# Patient Record
Sex: MALE | Race: WHITE
[De-identification: names, ages, dates, MRNs, and addresses within clinical notes are randomized per-mention and may not be internally consistent; named-entity substitution may affect disease eponyms.]

---

## 2017-03-25 ENCOUNTER — TRANSCRIPTION ENCOUNTER (OUTPATIENT)
Age: 37
End: 2017-03-25

## 2017-07-03 ENCOUNTER — OUTPATIENT (OUTPATIENT)
Dept: OUTPATIENT SERVICES | Facility: HOSPITAL | Age: 37
LOS: 1 days | Discharge: HOME | End: 2017-07-03

## 2017-07-03 DIAGNOSIS — F11.20 OPIOID DEPENDENCE, UNCOMPLICATED: ICD-10-CM

## 2017-07-25 ENCOUNTER — TRANSCRIPTION ENCOUNTER (OUTPATIENT)
Age: 37
End: 2017-07-25

## 2017-11-18 ENCOUNTER — TRANSCRIPTION ENCOUNTER (OUTPATIENT)
Age: 37
End: 2017-11-18

## 2018-10-16 ENCOUNTER — TRANSCRIPTION ENCOUNTER (OUTPATIENT)
Age: 38
End: 2018-10-16

## 2018-10-30 ENCOUNTER — TRANSCRIPTION ENCOUNTER (OUTPATIENT)
Age: 38
End: 2018-10-30

## 2019-02-02 ENCOUNTER — TRANSCRIPTION ENCOUNTER (OUTPATIENT)
Age: 39
End: 2019-02-02

## 2019-08-12 ENCOUNTER — TRANSCRIPTION ENCOUNTER (OUTPATIENT)
Age: 39
End: 2019-08-12

## 2020-03-18 ENCOUNTER — TRANSCRIPTION ENCOUNTER (OUTPATIENT)
Age: 40
End: 2020-03-18

## 2020-06-11 ENCOUNTER — EMERGENCY (EMERGENCY)
Facility: HOSPITAL | Age: 40
LOS: 0 days | Discharge: HOME | End: 2020-06-11
Attending: EMERGENCY MEDICINE | Admitting: EMERGENCY MEDICINE
Payer: COMMERCIAL

## 2020-06-11 VITALS
HEART RATE: 85 BPM | OXYGEN SATURATION: 100 % | SYSTOLIC BLOOD PRESSURE: 125 MMHG | RESPIRATION RATE: 18 BRPM | DIASTOLIC BLOOD PRESSURE: 76 MMHG

## 2020-06-11 VITALS
TEMPERATURE: 98 F | OXYGEN SATURATION: 97 % | SYSTOLIC BLOOD PRESSURE: 145 MMHG | RESPIRATION RATE: 20 BRPM | HEART RATE: 117 BPM | DIASTOLIC BLOOD PRESSURE: 95 MMHG

## 2020-06-11 DIAGNOSIS — F41.9 ANXIETY DISORDER, UNSPECIFIED: ICD-10-CM
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
## 2020-06-11 DIAGNOSIS — S00.83XA CONTUSION OF OTHER PART OF HEAD, INITIAL ENCOUNTER: ICD-10-CM

## 2020-06-11 DIAGNOSIS — M54.2 CERVICALGIA: ICD-10-CM
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
## 2020-06-11 LAB
ALBUMIN SERPL ELPH-MCNC: 4.7 G/DL — SIGNIFICANT CHANGE UP (ref 3.5–5.2)
ALP SERPL-CCNC: 47 U/L — SIGNIFICANT CHANGE UP (ref 30–115)
ALT FLD-CCNC: 46 U/L — HIGH (ref 0–41)
ANION GAP SERPL CALC-SCNC: 14 MMOL/L — SIGNIFICANT CHANGE UP (ref 7–14)
APTT BLD: 26.1 SEC — LOW (ref 27–39.2)
AST SERPL-CCNC: 36 U/L — SIGNIFICANT CHANGE UP (ref 0–41)
BASOPHILS # BLD AUTO: 0.08 K/UL — SIGNIFICANT CHANGE UP (ref 0–0.2)
BASOPHILS NFR BLD AUTO: 0.8 % — SIGNIFICANT CHANGE UP (ref 0–1)
BILIRUB SERPL-MCNC: 0.3 MG/DL — SIGNIFICANT CHANGE UP (ref 0.2–1.2)
BLD GP AB SCN SERPL QL: SIGNIFICANT CHANGE UP
BUN SERPL-MCNC: 18 MG/DL — SIGNIFICANT CHANGE UP (ref 10–20)
CALCIUM SERPL-MCNC: 9.2 MG/DL — SIGNIFICANT CHANGE UP (ref 8.5–10.1)
CHLORIDE SERPL-SCNC: 107 MMOL/L — SIGNIFICANT CHANGE UP (ref 98–110)
CO2 SERPL-SCNC: 22 MMOL/L — SIGNIFICANT CHANGE UP (ref 17–32)
CREAT SERPL-MCNC: 1.4 MG/DL — SIGNIFICANT CHANGE UP (ref 0.7–1.5)
EOSINOPHIL # BLD AUTO: 0.18 K/UL — SIGNIFICANT CHANGE UP (ref 0–0.7)
EOSINOPHIL NFR BLD AUTO: 1.7 % — SIGNIFICANT CHANGE UP (ref 0–8)
ETHANOL SERPL-MCNC: <10 MG/DL — SIGNIFICANT CHANGE UP
GLUCOSE SERPL-MCNC: 142 MG/DL — HIGH (ref 70–99)
HCT VFR BLD CALC: 44.8 % — SIGNIFICANT CHANGE UP (ref 42–52)
HGB BLD-MCNC: 15.1 G/DL — SIGNIFICANT CHANGE UP (ref 14–18)
IMM GRANULOCYTES NFR BLD AUTO: 0.6 % — HIGH (ref 0.1–0.3)
INR BLD: 1.14 RATIO — SIGNIFICANT CHANGE UP (ref 0.65–1.3)
LACTATE SERPL-SCNC: 1 MMOL/L — SIGNIFICANT CHANGE UP (ref 0.7–2)
LIDOCAIN IGE QN: 30 U/L — SIGNIFICANT CHANGE UP (ref 7–60)
LYMPHOCYTES # BLD AUTO: 19.9 % — LOW (ref 20.5–51.1)
LYMPHOCYTES # BLD AUTO: 2.11 K/UL — SIGNIFICANT CHANGE UP (ref 1.2–3.4)
MCHC RBC-ENTMCNC: 31.5 PG — HIGH (ref 27–31)
MCHC RBC-ENTMCNC: 33.7 G/DL — SIGNIFICANT CHANGE UP (ref 32–37)
MCV RBC AUTO: 93.3 FL — SIGNIFICANT CHANGE UP (ref 80–94)
MONOCYTES # BLD AUTO: 0.93 K/UL — HIGH (ref 0.1–0.6)
MONOCYTES NFR BLD AUTO: 8.8 % — SIGNIFICANT CHANGE UP (ref 1.7–9.3)
NEUTROPHILS # BLD AUTO: 7.23 K/UL — HIGH (ref 1.4–6.5)
NEUTROPHILS NFR BLD AUTO: 68.2 % — SIGNIFICANT CHANGE UP (ref 42.2–75.2)
NRBC # BLD: 0 /100 WBCS — SIGNIFICANT CHANGE UP (ref 0–0)
PLATELET # BLD AUTO: 413 K/UL — HIGH (ref 130–400)
POTASSIUM SERPL-MCNC: 4.6 MMOL/L — SIGNIFICANT CHANGE UP (ref 3.5–5)
POTASSIUM SERPL-SCNC: 4.6 MMOL/L — SIGNIFICANT CHANGE UP (ref 3.5–5)
PROT SERPL-MCNC: 7.2 G/DL — SIGNIFICANT CHANGE UP (ref 6–8)
PROTHROM AB SERPL-ACNC: 13.1 SEC — HIGH (ref 9.95–12.87)
RBC # BLD: 4.8 M/UL — SIGNIFICANT CHANGE UP (ref 4.7–6.1)
RBC # FLD: 15.5 % — HIGH (ref 11.5–14.5)
SODIUM SERPL-SCNC: 143 MMOL/L — SIGNIFICANT CHANGE UP (ref 135–146)
TROPONIN T SERPL-MCNC: <0.01 NG/ML — SIGNIFICANT CHANGE UP
WBC # BLD: 10.59 K/UL — SIGNIFICANT CHANGE UP (ref 4.8–10.8)
WBC # FLD AUTO: 10.59 K/UL — SIGNIFICANT CHANGE UP (ref 4.8–10.8)

## 2020-06-11 PROCEDURE — 70450 CT HEAD/BRAIN W/O DYE: CPT | Mod: 26

## 2020-06-11 PROCEDURE — 99282 EMERGENCY DEPT VISIT SF MDM: CPT

## 2020-06-11 PROCEDURE — 72170 X-RAY EXAM OF PELVIS: CPT | Mod: 26

## 2020-06-11 PROCEDURE — 73030 X-RAY EXAM OF SHOULDER: CPT | Mod: 26,50

## 2020-06-11 PROCEDURE — 99291 CRITICAL CARE FIRST HOUR: CPT

## 2020-06-11 PROCEDURE — 72125 CT NECK SPINE W/O DYE: CPT | Mod: 26

## 2020-06-11 PROCEDURE — 74177 CT ABD & PELVIS W/CONTRAST: CPT | Mod: 26

## 2020-06-11 PROCEDURE — 71260 CT THORAX DX C+: CPT | Mod: 26

## 2020-06-11 PROCEDURE — 71045 X-RAY EXAM CHEST 1 VIEW: CPT | Mod: 26

## 2020-06-11 RX ORDER — ACETAMINOPHEN 500 MG
650 TABLET ORAL ONCE
Refills: 0 | Status: COMPLETED | OUTPATIENT
Start: 2020-06-11 | End: 2020-06-11

## 2020-06-11 RX ADMIN — Medication 650 MILLIGRAM(S): at 09:20
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
## 2020-06-11 NOTE — ED PROVIDER NOTE - NSFOLLOWUPCLINICS_GEN_ALL_ED_FT
[facility] ([facility])  Rehabilitation  [facility] [address], [address] [place] [address]  Phone: [phone]  Fax:   Follow Up Time:     [facility]  Trauma Surgery  [address], [facility] [place] [address]  Phone: [phone]  Fax:   Follow Up Time:

## 2020-06-11 NOTE — ED PROVIDER NOTE - PATIENT PORTAL LINK FT
You can access the FollowMyHealth Patient Portal offered by [facility] by registering at the following website: [email or web address]. By joining [facility]’s FollowMyHealth portal, you will also be able to view your health information using other applications (apps) compatible with our system.
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
## 2020-06-11 NOTE — ED ADULT NURSE NOTE - NSIMPLEMENTINTERV_GEN_ALL_ED
Implemented All Universal Safety Interventions:  [place] to call system. Call bell, personal items and telephone within reach. Instruct patient to call for assistance. Room bathroom lighting operational. Non-slip footwear when patient is off stretcher. Physically safe environment: no spills, clutter or unnecessary equipment. Stretcher in lowest position, wheels locked, appropriate side rails in place.
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
## 2020-06-11 NOTE — ED PROVIDER NOTE - NS ED ROS FT
pt is very anxious,   NAD,   AAOx3,   head (+) bruise and swelling to right forehead,   CN II-XII intact,   PEERL, EOMi,   TM (-) hemotympanium,   neck (+) TTP over C7-T1,   lungs CTA B/L,   CV S1S2 regular,   abdomen soft/NT/ND/(+)BS,   ext (-) edema,   motor 5/5x4, sensation intact,   reports tingling in b/l UE.

## 2020-06-11 NOTE — ED PROVIDER NOTE - PHYSICAL EXAMINATION
Constitutional: Well developed, well nourished. NAD  TRAUMA: ABC intact. GCS 15. FAST negative.  Head: Normocephalic. +swelling w/ hematoma L frontal  Eyes: PERRL. EOMI. No Raccoon eyes.   ENT: No nasal discharge. No septal hematoma. No Mark sign. Mucous membranes moist.  Neck: Supple. Painless ROM. No midline tenderness, stepoffs.  Cardiovascular: Normal S1, S2. Regular rate and rhythm. No murmurs, rubs, or gallops.  Pulmonary: Normal respiratory rate and effort. Lungs clear to auscultation bilaterally. No wheezing, rales, or rhonchi.  CHEST: No chest wall tenderness, crepitus.  Abdominal: Soft. Nondistended. Nontender. No rebound, guarding, rigidity.  BACK: No midline T/L/S tenderness, stepoffs. No saddle paresthesia.  Extremities. Pelvis stable. No traumatic deformities, tenderness of extremities.  Skin: No rashes, cyanosis, lacerations, abrasions.  Neuro: AAOx3. Strength 5/5 in all extremities. Sensation intact throughout. No focal neurological deficits.  Psych: Anxious. Normal affect.
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
## 2020-06-11 NOTE — ED PROVIDER NOTE - CRITICAL CARE PROVIDED
additional history taking/consultation with other physicians/interpretation of diagnostic studies/documentation

## 2020-06-13 ENCOUNTER — EMERGENCY (EMERGENCY)
Facility: HOSPITAL | Age: 40
LOS: 0 days | Discharge: HOME | End: 2020-06-13
Attending: STUDENT IN AN ORGANIZED HEALTH CARE EDUCATION/TRAINING PROGRAM | Admitting: STUDENT IN AN ORGANIZED HEALTH CARE EDUCATION/TRAINING PROGRAM
Payer: COMMERCIAL

## 2020-06-13 VITALS
OXYGEN SATURATION: 100 % | TEMPERATURE: 98 F | RESPIRATION RATE: 19 BRPM | SYSTOLIC BLOOD PRESSURE: 145 MMHG | HEART RATE: 78 BPM | DIASTOLIC BLOOD PRESSURE: 86 MMHG

## 2020-06-13 VITALS
RESPIRATION RATE: 17 BRPM | DIASTOLIC BLOOD PRESSURE: 81 MMHG | SYSTOLIC BLOOD PRESSURE: 137 MMHG | HEART RATE: 74 BPM | OXYGEN SATURATION: 100 %
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
## 2020-06-13 DIAGNOSIS — R22.0 LOCALIZED SWELLING, MASS AND LUMP, HEAD: ICD-10-CM

## 2020-06-13 DIAGNOSIS — S00.83XA CONTUSION OF OTHER PART OF HEAD, INITIAL ENCOUNTER: ICD-10-CM

## 2020-06-13 DIAGNOSIS — S00.03XA CONTUSION OF SCALP, INITIAL ENCOUNTER: ICD-10-CM
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
## 2020-06-13 PROCEDURE — 99284 EMERGENCY DEPT VISIT MOD MDM: CPT

## 2020-06-13 PROCEDURE — 70486 CT MAXILLOFACIAL W/O DYE: CPT | Mod: 26

## 2020-06-13 RX ORDER — OXYCODONE AND ACETAMINOPHEN 5; 325 MG/1; MG/1
1 TABLET ORAL ONCE
Refills: 0 | Status: DISCONTINUED | OUTPATIENT
Start: 2020-06-13 | End: 2020-06-13

## 2020-06-13 RX ADMIN — OXYCODONE AND ACETAMINOPHEN 1 TABLET(S): 5; 325 TABLET ORAL at 11:30

## 2020-06-13 NOTE — ED PROVIDER NOTE - NSFOLLOWUPINSTRUCTIONS_ED_ALL_ED_FT
Hematoma    A hematoma is a collection of blood under the skin, in an organ, in a body space, in a joint space, or in other tissue. The blood can clot to form a lump that you can see and feel. The lump is often firm and may sometimes become sore and tender. Most hematomas get better in a few days to weeks. However, some hematomas may be serious and require medical care. Hematomas can range in size from very small to very large.     CAUSES  A hematoma can be caused by a blunt or penetrating injury. It can also be caused by spontaneous leakage from a blood vessel under the skin. Spontaneous leakage from a blood vessel is more likely to occur in older people, especially those taking blood thinners. Sometimes, a hematoma can develop after certain medical procedures.    SIGNS AND SYMPTOMS  A firm lump on the body.   Possible pain and tenderness in the area.  Bruising. Blue, dark blue, purple-red, or yellowish skin may appear at the site of the hematoma if the hematoma is close to the surface of the skin.     For hematomas in deeper tissues or body spaces, the signs and symptoms may be subtle. For example, an intra-abdominal hematoma may cause abdominal pain, weakness, fainting, and shortness of breath. An intracranial hematoma may cause a headache or symptoms such as weakness, trouble speaking, or a change in consciousness.    DIAGNOSIS  A hematoma can usually be diagnosed based on your medical history and a physical exam. Imaging tests may be needed if your health care provider suspects a hematoma in deeper tissues or body spaces, such as the abdomen, head, or chest. These tests may include ultrasonography or a CT scan.     TREATMENT  Hematomas usually go away on their own over time. Rarely does the blood need to be drained out of the body. Large hematomas or those that may affect vital organs will sometimes need surgical drainage or monitoring.    HOME CARE INSTRUCTIONS  Apply ice to the injured area:    Put ice in a plastic bag.    Place a towel between your skin and the bag.    Leave the ice on for 20 minutes, 2–3 times a day for the first 1 to 2 days.    After the first 2 days, switch to using warm compresses on the hematoma.    Elevate the injured area to help decrease pain and swelling. Wrapping the area with an elastic bandage may also be helpful. Compression helps to reduce swelling and promotes shrinking of the hematoma. Make sure the bandage is not wrapped too tight.    If your hematoma is on a lower extremity and is painful, crutches may be helpful for a couple days.    Only take over-the-counter or prescription medicines as directed by your health care provider.     SEEK IMMEDIATE MEDICAL CARE IF:  You have increasing pain, or your pain is not controlled with medicine.    You have a fever.    You have worsening swelling or discoloration.    Your skin over the hematoma breaks or starts bleeding.    Your hematoma is in your chest or abdomen and you have weakness, shortness of breath, or a change in consciousness.  Your hematoma is on your scalp (caused by a fall or injury) and you have a worsening headache or a change in alertness or consciousness.    MAKE SURE YOU:  Understand these instructions.   Will watch your condition.  Will get help right away if you are not doing well or get worse.    ADDITIONAL NOTES AND INSTRUCTIONS    Please follow up with your Primary MD in 24-48 hr.  Seek immediate medical care for any new/worsening signs or symptoms.

## 2020-06-13 NOTE — ED PROVIDER NOTE - PHYSICAL EXAMINATION
GEN: Alert & Oriented x 3, No acute distress. Calm, appropriate.  Head and Neck: Swelling noted to right forehead scalp and eye. Tenderness with palpation to right forehead.  ENT:Oral mucosa pink, moist without lesions. No trismus. Tenderness with palpation to right maxilla. no step offs.   Eyes: PERRL. EOMI. subconjunctival hemorrhage to right eye. No hyphema. No conjunctival injection. Swelling around right eye.   RESP: Lungs clear to auscult bilat. no wheezes, rhonchi or rales. No retractions. Equal air entry.  CARDIO: regular rate and rhythm, no murmurs, rubs or gallops. Normal S1, S2.  Radial pulses 2+ bilaterally.   ABD: Soft, Nondistended. No rebound tenderness/guarding.  No pulsatile mass. No tenderness with palpation x 4 quadrants.  MS: no midline tenderness throughout. tenderness to paraspinous musculature mid-thoracic. no obvious swelling or deformities.   SKIN: ecchymosis noted to bilateral eyes.   Neuro: A&O x3, CN II- XII intact, strength 5/5 throughout extremities, sensation intact. Speech & mentation intact. No drooping of eye or mouth. Without dysarthria or aphasia. Finger to nose intact. Romberg Neg. Coordinated gait.
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
## 2020-06-13 NOTE — ED PROVIDER NOTE - NS ED ROS FT
GEN: (-) fever, (-) chills, (-) malaise  HEENT: (-) vision changes, (-) HA  CV: (-) chest pain  PULM: (-) cough, (-) wheezing, (-) dyspnea  GI: (-) abdominal pain,(-) Nausea, (-) Vomiting  NEURO: (-) weakness, (-) paresthesias, (-) syncope, (-) dizziness  : (-) dysuria, (-) frequency, (-) urgency, (-) incontinence   MS: (+) back pain, (-) joint pain, (-)myalgias, (+) swelling  SKIN: (-) rashes, (-) new lesions  HEME: (-) bleeding, (+) ecchymosis

## 2020-06-13 NOTE — ED PROVIDER NOTE - CARE PROVIDER_API CALL
Your Primary Care Provider,   Phone: (   )    -  Fax: (   )    -  Follow Up Time: 1-3 Days    Portia Nye  REHAB IP PROF-OFFICE STAFF  [address] [place] [address]  Phone: [phone]  Fax: [phone]  Follow Up Time:

## 2020-06-13 NOTE — ED PROVIDER NOTE - PROVIDER TOKENS
FREE:[LAST:[Your Primary Care Provider],PHONE:[(   )    -],FAX:[(   )    -],FOLLOWUP:[1-3 Days]],PROVIDER:[TOKEN:[[id number]]]
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
## 2020-06-13 NOTE — ED PROVIDER NOTE - NSFOLLOWUPCLINICS_GEN_ALL_ED_FT
[facility]  Dental  [address] [place] [address]  Phone: [phone]  Fax:   Follow Up Time: 1-3 Days    [facility]  Trauma Surgery  [address] [place] [address]  Phone: [phone]  Fax:   Follow Up Time:

## 2020-06-13 NOTE — ED PROVIDER NOTE - PATIENT PORTAL LINK FT
You can access the FollowMyHealth Patient Portal offered by [facility] by registering at the following website: [email or web address]. By joining [employer]’s FollowMyHealth portal, you will also be able to view your health information using other applications (apps) compatible with our system.

## 2020-06-18 ENCOUNTER — EMERGENCY (EMERGENCY)
Facility: HOSPITAL | Age: 40
LOS: 0 days | Discharge: HOME | End: 2020-06-18
Attending: EMERGENCY MEDICINE | Admitting: EMERGENCY MEDICINE
Payer: COMMERCIAL

## 2020-06-18 VITALS
SYSTOLIC BLOOD PRESSURE: 137 MMHG | HEART RATE: 97 BPM | RESPIRATION RATE: 20 BRPM | DIASTOLIC BLOOD PRESSURE: 71 MMHG | OXYGEN SATURATION: 98 %

## 2020-06-18 VITALS
WEIGHT: 240.08 LBS | DIASTOLIC BLOOD PRESSURE: 69 MMHG | RESPIRATION RATE: 20 BRPM | TEMPERATURE: 98 F | OXYGEN SATURATION: 98 % | HEART RATE: 110 BPM | SYSTOLIC BLOOD PRESSURE: 141 MMHG

## 2020-06-18 DIAGNOSIS — S05.12XA CONTUSION OF EYEBALL AND ORBITAL TISSUES, LEFT EYE, INITIAL ENCOUNTER: ICD-10-CM

## 2020-06-18 DIAGNOSIS — S05.11XA CONTUSION OF EYEBALL AND ORBITAL TISSUES, RIGHT EYE, INITIAL ENCOUNTER: ICD-10-CM
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
## 2020-06-18 DIAGNOSIS — S00.83XA CONTUSION OF OTHER PART OF HEAD, INITIAL ENCOUNTER: ICD-10-CM

## 2020-06-18 DIAGNOSIS — S09.90XA UNSPECIFIED INJURY OF HEAD, INITIAL ENCOUNTER: ICD-10-CM
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
## 2020-06-18 PROCEDURE — 99283 EMERGENCY DEPT VISIT LOW MDM: CPT
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
## 2020-06-18 NOTE — ED PROVIDER NOTE - NSFOLLOWUPCLINICS_GEN_ALL_ED_FT
[facility]  Trauma Surgery  [address] [place] [address]  Phone: [phone]  Fax:   Follow Up Time: 1-3 Days
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
## 2020-06-18 NOTE — ED ADULT NURSE NOTE - NSIMPLEMENTINTERV_GEN_ALL_ED
Implemented All Universal Safety Interventions:  [place] to call system. Call bell, personal items and telephone within reach. Instruct patient to call for assistance. Room bathroom lighting operational. Non-slip footwear when patient is off stretcher. Physically safe environment: no spills, clutter or unnecessary equipment. Stretcher in lowest position, wheels locked, appropriate side rails in place.

## 2020-06-18 NOTE — ED PROVIDER NOTE - PHYSICAL EXAMINATION
CONSTITUTIONAL:  in no acute distress.   SKIN: echymosis around periorbital areas b/l and b/l cheeks. right forehead hematoma.   HEAD: +right forehead hematoma ttp.   EYES: EOM. PERRL. b/l ecchymosis periorbital areas b/l. no proptosis  ENT: No nasal discharge; airway clear. no septal hematoma or hemotypanum;   NECK: no midline tenderness, normal ROM  CHEST: no crepitus or bruising  CARD: S1, S2 normal; no murmurs, gallops, or rubs. Regular rate and rhythm.   RESP: No wheezes, rales or rhonchi.  ABD: soft ntnd  BACK: no midline tenderness or step offs  PELVIS: no laxity with lateral compression  EXT: no gross extremity injury  NEURO: gcs 15, moving all extremities grossly, following commands  PSYCH: Cooperative, appropriate
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
## 2020-06-18 NOTE — ED PROVIDER NOTE - PATIENT PORTAL LINK FT
You can access the FollowMyHealth Patient Portal offered by [facility] by registering at the following website: [email or web address]. By joining [employer]’s FollowMyHealth portal, you will also be able to view your health information using other applications (apps) compatible with our system.

## 2021-09-22 ENCOUNTER — EMERGENCY (EMERGENCY)
Facility: HOSPITAL | Age: 41
LOS: 0 days | Discharge: LEFT BEFORE DISCHARGE | End: 2021-09-22
Attending: EMERGENCY MEDICINE | Admitting: EMERGENCY MEDICINE
Payer: COMMERCIAL

## 2021-09-22 DIAGNOSIS — F19.239 OTHER PSYCHOACTIVE SUBSTANCE DEPENDENCE WITH WITHDRAWAL, UNSPECIFIED: ICD-10-CM

## 2021-09-22 DIAGNOSIS — F41.9 ANXIETY DISORDER, UNSPECIFIED: ICD-10-CM

## 2021-09-22 PROCEDURE — 99284 EMERGENCY DEPT VISIT MOD MDM: CPT

## 2021-09-22 NOTE — ED PROVIDER NOTE - PHYSICAL EXAMINATION
--EXAM--  VITAL SIGNS: I have reviewed vs documented at present.  CONSTITUTIONAL: Well-developed; well-nourished; in no acute distress.   SKIN: Warm and dry, no acute rash.     NECK: Supple; non tender.  CARD: S1, S2, Regular rate and rhythm.   RESP: No wheezes, rales or rhonchi.     NEURO: Alert, oriented, grossly unremarkable. Strength 5/5 in all extremities. Sensation intact throughout.  PSYCH: Cooperative, appropriate.

## 2021-09-22 NOTE — ED PROVIDER NOTE - OBJECTIVE STATEMENT
this is a 40 yo male presents to ed for evaluation . patient history of poly substance abuse. patient states he uses drug off the street. patient denies any suicidal and homicidal ideation . patient states that he is feeling anxious . patient states that he has no psychiatric illness .

## 2021-09-22 NOTE — ED ADULT TRIAGE NOTE - NS ED NURSE ELOPE COMMENTS
patient left before triage, patient saw a provider, no nursing assessment completed, left prior to nursing

## 2021-09-22 NOTE — ED PROVIDER NOTE - NS ED ROS FT
Review of Systems:  	•	CONSTITUTIONAL - no fever, no diaphoresis, no chills  	•	SKIN - no rash  	•	HEMATOLOGIC - no bleeding, no bruising    	•	RESPIRATORY - no shortness of breath, no cough  	•	CARDIAC - no chest pain, no palpitations    	•	MUSCULOSKELETAL - no joint paint, no swelling, no redness  	•	NEUROLOGIC - no weakness, no headache, no paresthesias, no LOC  	•	PSYCH -  anxiety, non suicidal, non homicidal, no hallucination, no depression

## 2021-09-22 NOTE — ED PROVIDER NOTE - PROGRESS NOTE DETAILS
patient decided he does not want to speak to psych in ed. patient states I will follow up out patient. patient then walked out of ed.   Patient girlfriend followed him out

## 2021-09-22 NOTE — ED PROVIDER NOTE - CLINICAL SUMMARY MEDICAL DECISION MAKING FREE TEXT BOX
41yM  pw  "withdrawal symptoms" from  self dc ing Adderall and percocet that he was getting off the street.   Pt  took xanax today.  Pt denies  SI HI.   offered labs and  symptomatic treatment - pt was refusing  redcap inclusion.  pt eloped prior to meds  and labs

## 2022-07-21 ENCOUNTER — INPATIENT (INPATIENT)
Facility: HOSPITAL | Age: 42
LOS: 1 days | Discharge: LEFT BEFORE DISCHARGE | End: 2022-07-23
Attending: STUDENT IN AN ORGANIZED HEALTH CARE EDUCATION/TRAINING PROGRAM | Admitting: STUDENT IN AN ORGANIZED HEALTH CARE EDUCATION/TRAINING PROGRAM

## 2022-07-21 VITALS
HEART RATE: 141 BPM | DIASTOLIC BLOOD PRESSURE: 81 MMHG | OXYGEN SATURATION: 98 % | TEMPERATURE: 100 F | SYSTOLIC BLOOD PRESSURE: 140 MMHG | WEIGHT: 240.08 LBS | RESPIRATION RATE: 20 BRPM

## 2022-07-21 PROBLEM — F19.10 OTHER PSYCHOACTIVE SUBSTANCE ABUSE, UNCOMPLICATED: Chronic | Status: ACTIVE | Noted: 2021-09-22

## 2022-07-21 LAB
ALBUMIN SERPL ELPH-MCNC: 4.2 G/DL — SIGNIFICANT CHANGE UP (ref 3.5–5.2)
ALBUMIN SERPL ELPH-MCNC: 4.3 G/DL — SIGNIFICANT CHANGE UP (ref 3.5–5.2)
ALP SERPL-CCNC: 100 U/L — SIGNIFICANT CHANGE UP (ref 30–115)
ALP SERPL-CCNC: 98 U/L — SIGNIFICANT CHANGE UP (ref 30–115)
ALT FLD-CCNC: 47 U/L — HIGH (ref 0–41)
ALT FLD-CCNC: 49 U/L — HIGH (ref 0–41)
ANION GAP SERPL CALC-SCNC: 10 MMOL/L — SIGNIFICANT CHANGE UP (ref 7–14)
ANION GAP SERPL CALC-SCNC: 12 MMOL/L — SIGNIFICANT CHANGE UP (ref 7–14)
APAP SERPL-MCNC: <5 UG/ML — LOW (ref 10–30)
APPEARANCE UR: CLEAR — SIGNIFICANT CHANGE UP
AST SERPL-CCNC: 32 U/L — SIGNIFICANT CHANGE UP (ref 0–41)
AST SERPL-CCNC: 33 U/L — SIGNIFICANT CHANGE UP (ref 0–41)
BASE EXCESS BLDV CALC-SCNC: 3.6 MMOL/L — HIGH (ref -2–3)
BASOPHILS # BLD AUTO: 0.08 K/UL — SIGNIFICANT CHANGE UP (ref 0–0.2)
BASOPHILS NFR BLD AUTO: 0.8 % — SIGNIFICANT CHANGE UP (ref 0–1)
BILIRUB DIRECT SERPL-MCNC: <0.2 MG/DL — SIGNIFICANT CHANGE UP (ref 0–0.3)
BILIRUB INDIRECT FLD-MCNC: >0.3 MG/DL — SIGNIFICANT CHANGE UP (ref 0.2–1.2)
BILIRUB SERPL-MCNC: 0.3 MG/DL — SIGNIFICANT CHANGE UP (ref 0.2–1.2)
BILIRUB SERPL-MCNC: 0.5 MG/DL — SIGNIFICANT CHANGE UP (ref 0.2–1.2)
BILIRUB UR-MCNC: NEGATIVE — SIGNIFICANT CHANGE UP
BUN SERPL-MCNC: 13 MG/DL — SIGNIFICANT CHANGE UP (ref 10–20)
BUN SERPL-MCNC: 15 MG/DL — SIGNIFICANT CHANGE UP (ref 10–20)
CA-I SERPL-SCNC: 1.17 MMOL/L — SIGNIFICANT CHANGE UP (ref 1.15–1.33)
CALCIUM SERPL-MCNC: 9.2 MG/DL — SIGNIFICANT CHANGE UP (ref 8.5–10.1)
CALCIUM SERPL-MCNC: 9.2 MG/DL — SIGNIFICANT CHANGE UP (ref 8.5–10.1)
CHLORIDE SERPL-SCNC: 100 MMOL/L — SIGNIFICANT CHANGE UP (ref 98–110)
CHLORIDE SERPL-SCNC: 102 MMOL/L — SIGNIFICANT CHANGE UP (ref 98–110)
CK SERPL-CCNC: 457 U/L — HIGH (ref 0–225)
CO2 SERPL-SCNC: 26 MMOL/L — SIGNIFICANT CHANGE UP (ref 17–32)
CO2 SERPL-SCNC: 27 MMOL/L — SIGNIFICANT CHANGE UP (ref 17–32)
COLOR SPEC: YELLOW — SIGNIFICANT CHANGE UP
CREAT SERPL-MCNC: 1.3 MG/DL — SIGNIFICANT CHANGE UP (ref 0.7–1.5)
CREAT SERPL-MCNC: 1.3 MG/DL — SIGNIFICANT CHANGE UP (ref 0.7–1.5)
DIFF PNL FLD: NEGATIVE — SIGNIFICANT CHANGE UP
EGFR: 71 ML/MIN/1.73M2 — SIGNIFICANT CHANGE UP
EGFR: 71 ML/MIN/1.73M2 — SIGNIFICANT CHANGE UP
EOSINOPHIL # BLD AUTO: 0.4 K/UL — SIGNIFICANT CHANGE UP (ref 0–0.7)
EOSINOPHIL NFR BLD AUTO: 4 % — SIGNIFICANT CHANGE UP (ref 0–8)
ETHANOL SERPL-MCNC: <10 MG/DL — SIGNIFICANT CHANGE UP
GAS PNL BLDV: 137 MMOL/L — SIGNIFICANT CHANGE UP (ref 136–145)
GAS PNL BLDV: SIGNIFICANT CHANGE UP
GLUCOSE SERPL-MCNC: 93 MG/DL — SIGNIFICANT CHANGE UP (ref 70–99)
GLUCOSE SERPL-MCNC: 98 MG/DL — SIGNIFICANT CHANGE UP (ref 70–99)
GLUCOSE UR QL: NEGATIVE MG/DL — SIGNIFICANT CHANGE UP
HCO3 BLDV-SCNC: 28 MMOL/L — SIGNIFICANT CHANGE UP (ref 22–29)
HCT VFR BLD CALC: 43.1 % — SIGNIFICANT CHANGE UP (ref 42–52)
HCT VFR BLDA CALC: 44 % — SIGNIFICANT CHANGE UP (ref 39–51)
HGB BLD CALC-MCNC: 14.5 G/DL — SIGNIFICANT CHANGE UP (ref 12.6–17.4)
HGB BLD-MCNC: 14 G/DL — SIGNIFICANT CHANGE UP (ref 14–18)
IMM GRANULOCYTES NFR BLD AUTO: 0.4 % — HIGH (ref 0.1–0.3)
KETONES UR-MCNC: NEGATIVE — SIGNIFICANT CHANGE UP
LACTATE BLDV-MCNC: 2.1 MMOL/L — HIGH (ref 0.5–2)
LEUKOCYTE ESTERASE UR-ACNC: NEGATIVE — SIGNIFICANT CHANGE UP
LYMPHOCYTES # BLD AUTO: 1.74 K/UL — SIGNIFICANT CHANGE UP (ref 1.2–3.4)
LYMPHOCYTES # BLD AUTO: 17.2 % — LOW (ref 20.5–51.1)
MAGNESIUM SERPL-MCNC: 1.8 MG/DL — SIGNIFICANT CHANGE UP (ref 1.8–2.4)
MAGNESIUM SERPL-MCNC: 1.8 MG/DL — SIGNIFICANT CHANGE UP (ref 1.8–2.4)
MCHC RBC-ENTMCNC: 27.1 PG — SIGNIFICANT CHANGE UP (ref 27–31)
MCHC RBC-ENTMCNC: 32.5 G/DL — SIGNIFICANT CHANGE UP (ref 32–37)
MCV RBC AUTO: 83.5 FL — SIGNIFICANT CHANGE UP (ref 80–94)
MONOCYTES # BLD AUTO: 0.93 K/UL — HIGH (ref 0.1–0.6)
MONOCYTES NFR BLD AUTO: 9.2 % — SIGNIFICANT CHANGE UP (ref 1.7–9.3)
NEUTROPHILS # BLD AUTO: 6.93 K/UL — HIGH (ref 1.4–6.5)
NEUTROPHILS NFR BLD AUTO: 68.4 % — SIGNIFICANT CHANGE UP (ref 42.2–75.2)
NITRITE UR-MCNC: NEGATIVE — SIGNIFICANT CHANGE UP
NRBC # BLD: 0 /100 WBCS — SIGNIFICANT CHANGE UP (ref 0–0)
PCO2 BLDV: 43 MMHG — SIGNIFICANT CHANGE UP (ref 42–55)
PH BLDV: 7.43 — SIGNIFICANT CHANGE UP (ref 7.32–7.43)
PH UR: 5.5 — SIGNIFICANT CHANGE UP (ref 5–8)
PHOSPHATE SERPL-MCNC: 3.5 MG/DL — SIGNIFICANT CHANGE UP (ref 2.1–4.9)
PLATELET # BLD AUTO: 289 K/UL — SIGNIFICANT CHANGE UP (ref 130–400)
PO2 BLDV: 71 MMHG — SIGNIFICANT CHANGE UP
POTASSIUM BLDV-SCNC: 4.4 MMOL/L — SIGNIFICANT CHANGE UP (ref 3.5–5.1)
POTASSIUM SERPL-MCNC: 4.5 MMOL/L — SIGNIFICANT CHANGE UP (ref 3.5–5)
POTASSIUM SERPL-MCNC: 4.5 MMOL/L — SIGNIFICANT CHANGE UP (ref 3.5–5)
POTASSIUM SERPL-SCNC: 4.5 MMOL/L — SIGNIFICANT CHANGE UP (ref 3.5–5)
POTASSIUM SERPL-SCNC: 4.5 MMOL/L — SIGNIFICANT CHANGE UP (ref 3.5–5)
PROT SERPL-MCNC: 6.4 G/DL — SIGNIFICANT CHANGE UP (ref 6–8)
PROT SERPL-MCNC: 6.7 G/DL — SIGNIFICANT CHANGE UP (ref 6–8)
PROT UR-MCNC: NEGATIVE MG/DL — SIGNIFICANT CHANGE UP
RAPID RVP RESULT: SIGNIFICANT CHANGE UP
RBC # BLD: 5.16 M/UL — SIGNIFICANT CHANGE UP (ref 4.7–6.1)
RBC # FLD: 15.7 % — HIGH (ref 11.5–14.5)
SALICYLATES SERPL-MCNC: <0.3 MG/DL — LOW (ref 4–30)
SAO2 % BLDV: 95.2 % — SIGNIFICANT CHANGE UP
SARS-COV-2 RNA SPEC QL NAA+PROBE: SIGNIFICANT CHANGE UP
SARS-COV-2 RNA SPEC QL NAA+PROBE: SIGNIFICANT CHANGE UP
SODIUM SERPL-SCNC: 138 MMOL/L — SIGNIFICANT CHANGE UP (ref 135–146)
SODIUM SERPL-SCNC: 139 MMOL/L — SIGNIFICANT CHANGE UP (ref 135–146)
SP GR SPEC: >=1.03 (ref 1.01–1.03)
UROBILINOGEN FLD QL: 0.2 MG/DL — SIGNIFICANT CHANGE UP
WBC # BLD: 10.12 K/UL — SIGNIFICANT CHANGE UP (ref 4.8–10.8)
WBC # FLD AUTO: 10.12 K/UL — SIGNIFICANT CHANGE UP (ref 4.8–10.8)

## 2022-07-21 PROCEDURE — 71045 X-RAY EXAM CHEST 1 VIEW: CPT | Mod: 26

## 2022-07-21 PROCEDURE — 99222 1ST HOSP IP/OBS MODERATE 55: CPT

## 2022-07-21 PROCEDURE — 99291 CRITICAL CARE FIRST HOUR: CPT

## 2022-07-21 PROCEDURE — 70450 CT HEAD/BRAIN W/O DYE: CPT | Mod: 26,MA

## 2022-07-21 PROCEDURE — 93010 ELECTROCARDIOGRAM REPORT: CPT | Mod: 76

## 2022-07-21 RX ORDER — ACETAMINOPHEN 500 MG
975 TABLET ORAL ONCE
Refills: 0 | Status: COMPLETED | OUTPATIENT
Start: 2022-07-21 | End: 2022-07-21

## 2022-07-21 RX ORDER — DEXMEDETOMIDINE HYDROCHLORIDE IN 0.9% SODIUM CHLORIDE 4 UG/ML
0.05 INJECTION INTRAVENOUS
Qty: 200 | Refills: 0 | Status: DISCONTINUED | OUTPATIENT
Start: 2022-07-21 | End: 2022-07-22

## 2022-07-21 RX ORDER — DIAZEPAM 5 MG
10 TABLET ORAL ONCE
Refills: 0 | Status: DISCONTINUED | OUTPATIENT
Start: 2022-07-21 | End: 2022-07-21

## 2022-07-21 RX ORDER — SODIUM CHLORIDE 9 MG/ML
1000 INJECTION, SOLUTION INTRAVENOUS ONCE
Refills: 0 | Status: COMPLETED | OUTPATIENT
Start: 2022-07-21 | End: 2022-07-21

## 2022-07-21 RX ORDER — THIAMINE MONONITRATE (VIT B1) 100 MG
100 TABLET ORAL DAILY
Refills: 0 | Status: DISCONTINUED | OUTPATIENT
Start: 2022-07-21 | End: 2022-07-23

## 2022-07-21 RX ORDER — DIAZEPAM 5 MG
20 TABLET ORAL ONCE
Refills: 0 | Status: DISCONTINUED | OUTPATIENT
Start: 2022-07-21 | End: 2022-07-21

## 2022-07-21 RX ORDER — FOLIC ACID 0.8 MG
1 TABLET ORAL DAILY
Refills: 0 | Status: DISCONTINUED | OUTPATIENT
Start: 2022-07-21 | End: 2022-07-23

## 2022-07-21 RX ADMIN — Medication 10 MILLIGRAM(S): at 12:18

## 2022-07-21 RX ADMIN — Medication 2 MILLIGRAM(S): at 21:00

## 2022-07-21 RX ADMIN — SODIUM CHLORIDE 1000 MILLILITER(S): 9 INJECTION, SOLUTION INTRAVENOUS at 12:29

## 2022-07-21 RX ADMIN — DEXMEDETOMIDINE HYDROCHLORIDE IN 0.9% SODIUM CHLORIDE 1.36 MICROGRAM(S)/KG/HR: 4 INJECTION INTRAVENOUS at 19:36

## 2022-07-21 RX ADMIN — Medication 975 MILLIGRAM(S): at 19:43

## 2022-07-21 RX ADMIN — Medication 975 MILLIGRAM(S): at 13:45

## 2022-07-21 RX ADMIN — Medication 20 MILLIGRAM(S): at 14:46

## 2022-07-21 RX ADMIN — Medication 10 MILLIGRAM(S): at 13:43

## 2022-07-21 NOTE — H&P ADULT - NSHPREVIEWOFSYSTEMS_GEN_ALL_CORE
REVIEW OF SYSTEMS  General:	+ shaking   Skin/Breast: no rashes	  Ophthalmologic: no blurry vision 	  ENMT:	no thrush   Respiratory and Thorax: no sob 	  Cardiovascular:	no chest pain   Gastrointestinal:	no diarrhea   Genitourinary:	no dysuria  Musculoskeletal:	 no wekaness  Neurological:	no weakness  Psychiatric:	no hallucinations

## 2022-07-21 NOTE — ED ADULT NURSE NOTE - NS TRANSFER DISPOSITION PATIENT BELONGINGS
Patient request cellphone to go to security, and clothing and vape to go mother/given to Security/given to family

## 2022-07-21 NOTE — H&P ADULT - ASSESSMENT
A/P:     1. + durg abuse / with drawl   -NPO   - start precedex ggt   - IV fluids  - CIWA protocol  - replete electrolytes  - labs and images reviewed personally    - DVT ppx

## 2022-07-21 NOTE — ED PROVIDER NOTE - PHYSICAL EXAMINATION
Vital Signs: I have reviewed the initial vital signs.  Constitutional: well-nourished, no acute distress, normocephalic  Eyes: PERRLA, EOMI, no nystagmus, clear conjunctiva  ENT: MMM, no tongue fasciculations, no nasal congestion  Cardiovascular: tachycardic, regular rhythm, no murmur appreciated  Respiratory: unlabored respiratory effort, clear to auscultation bilaterally  Gastrointestinal: soft, non-tender, non-distended  abdomen, no pulsatile mass  Musculoskeletal: supple neck, tremulous  no bony tenderness  Integumentary: warm, dry, no rash  Neurologic: awake, alert, cranial nerves II-XII grossly intact, extremities’ motor and sensory functions grossly intact, no focal deficits  Psychiatric: appropriate mood, appropriate affect

## 2022-07-21 NOTE — H&P ADULT - HISTORY OF PRESENT ILLNESS
HPI:42 year old man presents to the ED for evaluation of withdrawal symptoms. Patient has been taking street methadone, snorting cocaine and heroin 1 bundle per day (10 bags). He also taking unknown benzodiazepine with last usage last night. Patient c/o tremors and palpitations. Patient denies any SI/HI.  no vomiting , diarrhea or abdominal pain. Patient denies any dizziness or falls.Patient denies any alcohol usage. no fevers. patient with productive cough but denies any sob, hemoptysis. no chest pain, headaches. patient with decreased po intake.

## 2022-07-21 NOTE — ED PROVIDER NOTE - OBJECTIVE STATEMENT
41 y/o male presents to the Ed for evaluation of withdrawal symptoms. patient has been taking street methadone, snorting cocaine and heroin. patient also taking unknown benzodiazepine with last usage last night. patient c/o tremors and palpitations. patient denies any SI/HI.  no vomiting , diarrhea or abdominal pain. patient denies any dizziness or falls. patient denies any alcohol usage. no fevers. patient with productive cough but denies any sob, hemoptysis. no chest pain, headaches. patient with decreased po intake.

## 2022-07-21 NOTE — H&P ADULT - NSHPLABSRESULTS_GEN_ALL_CORE
labs  07-21    139  |  102  |  15  ----------------------------<  98  4.5   |  27  |  1.3    Ca    9.2      21 Jul 2022 12:21  Mg     1.8     07-21    TPro  6.7  /  Alb  4.3  /  TBili  0.3  /  DBili  x   /  AST  32  /  ALT  49<H>  /  AlkPhos  100  07-21                          14.0   10.12 )-----------( 289      ( 21 Jul 2022 12:21 )             43.1

## 2022-07-21 NOTE — ED PROVIDER NOTE - CRITICAL CARE ATTENDING CONTRIBUTION TO CARE
SEEN WITH PA  42y male above PMH BIB mother for detox, unclear when last benzo or opiate use but states has not done coke "in awhile," also uses steroids, denies h/a, cp, SI/HI/hallucinations, does admit to cough, on exam vital signs appreciated, tremulous head nc/at, perrla 3mm conj pink dry mm neck supple no meningismus cor tachy, reg, no m/r/g lungs cta abd +bs, snt/nd calves nontender skin warm, dry no rash neuro nonfocal AAO x 3, given ivf and valium with some improvement, intermittently confused, labs and studies reviewed and d/w intensivist, will admit stepdown

## 2022-07-22 DIAGNOSIS — F19.20 OTHER PSYCHOACTIVE SUBSTANCE DEPENDENCE, UNCOMPLICATED: ICD-10-CM

## 2022-07-22 LAB
ALBUMIN SERPL ELPH-MCNC: 4.1 G/DL — SIGNIFICANT CHANGE UP (ref 3.5–5.2)
ALP SERPL-CCNC: 90 U/L — SIGNIFICANT CHANGE UP (ref 30–115)
ALT FLD-CCNC: 42 U/L — HIGH (ref 0–41)
AMPHET UR-MCNC: POSITIVE
ANION GAP SERPL CALC-SCNC: 10 MMOL/L — SIGNIFICANT CHANGE UP (ref 7–14)
ANION GAP SERPL CALC-SCNC: 12 MMOL/L — SIGNIFICANT CHANGE UP (ref 7–14)
AST SERPL-CCNC: 25 U/L — SIGNIFICANT CHANGE UP (ref 0–41)
BARBITURATES UR SCN-MCNC: NEGATIVE — SIGNIFICANT CHANGE UP
BENZODIAZ UR-MCNC: POSITIVE
BILIRUB SERPL-MCNC: 0.7 MG/DL — SIGNIFICANT CHANGE UP (ref 0.2–1.2)
BUN SERPL-MCNC: 12 MG/DL — SIGNIFICANT CHANGE UP (ref 10–20)
BUN SERPL-MCNC: 12 MG/DL — SIGNIFICANT CHANGE UP (ref 10–20)
CALCIUM SERPL-MCNC: 9.1 MG/DL — SIGNIFICANT CHANGE UP (ref 8.5–10.1)
CALCIUM SERPL-MCNC: 9.2 MG/DL — SIGNIFICANT CHANGE UP (ref 8.5–10.1)
CHLORIDE SERPL-SCNC: 101 MMOL/L — SIGNIFICANT CHANGE UP (ref 98–110)
CHLORIDE SERPL-SCNC: 102 MMOL/L — SIGNIFICANT CHANGE UP (ref 98–110)
CO2 SERPL-SCNC: 25 MMOL/L — SIGNIFICANT CHANGE UP (ref 17–32)
CO2 SERPL-SCNC: 26 MMOL/L — SIGNIFICANT CHANGE UP (ref 17–32)
COCAINE METAB.OTHER UR-MCNC: NEGATIVE — SIGNIFICANT CHANGE UP
CREAT SERPL-MCNC: 1 MG/DL — SIGNIFICANT CHANGE UP (ref 0.7–1.5)
CREAT SERPL-MCNC: 1.1 MG/DL — SIGNIFICANT CHANGE UP (ref 0.7–1.5)
D DIMER BLD IA.RAPID-MCNC: 161 NG/ML DDU — SIGNIFICANT CHANGE UP (ref 0–230)
DRUG SCREEN 1, URINE RESULT: SIGNIFICANT CHANGE UP
EGFR: 87 ML/MIN/1.73M2 — SIGNIFICANT CHANGE UP
EGFR: 98 ML/MIN/1.73M2 — SIGNIFICANT CHANGE UP
FENTANYL UR QL: POSITIVE
GLUCOSE SERPL-MCNC: 109 MG/DL — HIGH (ref 70–99)
GLUCOSE SERPL-MCNC: 96 MG/DL — SIGNIFICANT CHANGE UP (ref 70–99)
HCT VFR BLD CALC: 39.2 % — LOW (ref 42–52)
HGB BLD-MCNC: 12.7 G/DL — LOW (ref 14–18)
MAGNESIUM SERPL-MCNC: 2 MG/DL — SIGNIFICANT CHANGE UP (ref 1.8–2.4)
MCHC RBC-ENTMCNC: 27 PG — SIGNIFICANT CHANGE UP (ref 27–31)
MCHC RBC-ENTMCNC: 32.4 G/DL — SIGNIFICANT CHANGE UP (ref 32–37)
MCV RBC AUTO: 83.2 FL — SIGNIFICANT CHANGE UP (ref 80–94)
METHADONE UR-MCNC: POSITIVE
NRBC # BLD: 0 /100 WBCS — SIGNIFICANT CHANGE UP (ref 0–0)
OPIATES UR-MCNC: NEGATIVE — SIGNIFICANT CHANGE UP
OXYCODONE UR-MCNC: NEGATIVE — SIGNIFICANT CHANGE UP
PCP UR-MCNC: NEGATIVE — SIGNIFICANT CHANGE UP
PHOSPHATE SERPL-MCNC: 3.3 MG/DL — SIGNIFICANT CHANGE UP (ref 2.1–4.9)
PLATELET # BLD AUTO: 254 K/UL — SIGNIFICANT CHANGE UP (ref 130–400)
POTASSIUM SERPL-MCNC: 4.5 MMOL/L — SIGNIFICANT CHANGE UP (ref 3.5–5)
POTASSIUM SERPL-MCNC: 5 MMOL/L — SIGNIFICANT CHANGE UP (ref 3.5–5)
POTASSIUM SERPL-SCNC: 4.5 MMOL/L — SIGNIFICANT CHANGE UP (ref 3.5–5)
POTASSIUM SERPL-SCNC: 5 MMOL/L — SIGNIFICANT CHANGE UP (ref 3.5–5)
PROPOXYPHENE QUALITATIVE URINE RESULT: NEGATIVE — SIGNIFICANT CHANGE UP
PROT SERPL-MCNC: 6.6 G/DL — SIGNIFICANT CHANGE UP (ref 6–8)
RBC # BLD: 4.71 M/UL — SIGNIFICANT CHANGE UP (ref 4.7–6.1)
RBC # FLD: 15.9 % — HIGH (ref 11.5–14.5)
SODIUM SERPL-SCNC: 137 MMOL/L — SIGNIFICANT CHANGE UP (ref 135–146)
SODIUM SERPL-SCNC: 139 MMOL/L — SIGNIFICANT CHANGE UP (ref 135–146)
THC UR QL: NEGATIVE — SIGNIFICANT CHANGE UP
TROPONIN T SERPL-MCNC: <0.01 NG/ML — SIGNIFICANT CHANGE UP
WBC # BLD: 11.23 K/UL — HIGH (ref 4.8–10.8)
WBC # FLD AUTO: 11.23 K/UL — HIGH (ref 4.8–10.8)

## 2022-07-22 PROCEDURE — 93010 ELECTROCARDIOGRAM REPORT: CPT

## 2022-07-22 PROCEDURE — 93306 TTE W/DOPPLER COMPLETE: CPT | Mod: 26

## 2022-07-22 PROCEDURE — 99251: CPT

## 2022-07-22 PROCEDURE — 99233 SBSQ HOSP IP/OBS HIGH 50: CPT

## 2022-07-22 PROCEDURE — 99221 1ST HOSP IP/OBS SF/LOW 40: CPT

## 2022-07-22 RX ORDER — METHADONE HYDROCHLORIDE 40 MG/1
TABLET ORAL
Refills: 0 | Status: DISCONTINUED | OUTPATIENT
Start: 2022-07-22 | End: 2022-07-23

## 2022-07-22 RX ORDER — DEXMEDETOMIDINE HYDROCHLORIDE IN 0.9% SODIUM CHLORIDE 4 UG/ML
0.4 INJECTION INTRAVENOUS
Qty: 400 | Refills: 0 | Status: DISCONTINUED | OUTPATIENT
Start: 2022-07-22 | End: 2022-07-23

## 2022-07-22 RX ORDER — METHADONE HYDROCHLORIDE 40 MG/1
15 TABLET ORAL EVERY 12 HOURS
Refills: 0 | Status: DISCONTINUED | OUTPATIENT
Start: 2022-07-22 | End: 2022-07-22

## 2022-07-22 RX ORDER — CHLORHEXIDINE GLUCONATE 213 G/1000ML
1 SOLUTION TOPICAL
Refills: 0 | Status: DISCONTINUED | OUTPATIENT
Start: 2022-07-22 | End: 2022-07-23

## 2022-07-22 RX ORDER — NICOTINE POLACRILEX 2 MG
4 GUM BUCCAL EVERY 4 HOURS
Refills: 0 | Status: DISCONTINUED | OUTPATIENT
Start: 2022-07-22 | End: 2022-07-23

## 2022-07-22 RX ORDER — ENOXAPARIN SODIUM 100 MG/ML
40 INJECTION SUBCUTANEOUS EVERY 24 HOURS
Refills: 0 | Status: DISCONTINUED | OUTPATIENT
Start: 2022-07-22 | End: 2022-07-23

## 2022-07-22 RX ORDER — DIAZEPAM 5 MG
10 TABLET ORAL EVERY 4 HOURS
Refills: 0 | Status: DISCONTINUED | OUTPATIENT
Start: 2022-07-22 | End: 2022-07-23

## 2022-07-22 RX ORDER — DEXMEDETOMIDINE HYDROCHLORIDE IN 0.9% SODIUM CHLORIDE 4 UG/ML
0.05 INJECTION INTRAVENOUS
Qty: 200 | Refills: 0 | Status: DISCONTINUED | OUTPATIENT
Start: 2022-07-22 | End: 2022-07-22

## 2022-07-22 RX ORDER — MIDAZOLAM HYDROCHLORIDE 1 MG/ML
4 INJECTION, SOLUTION INTRAMUSCULAR; INTRAVENOUS ONCE
Refills: 0 | Status: DISCONTINUED | OUTPATIENT
Start: 2022-07-22 | End: 2022-07-22

## 2022-07-22 RX ORDER — METHADONE HYDROCHLORIDE 40 MG/1
10 TABLET ORAL EVERY 12 HOURS
Refills: 0 | Status: DISCONTINUED | OUTPATIENT
Start: 2022-07-23 | End: 2022-07-23

## 2022-07-22 RX ORDER — HEPARIN SODIUM 5000 [USP'U]/ML
5000 INJECTION INTRAVENOUS; SUBCUTANEOUS EVERY 12 HOURS
Refills: 0 | Status: DISCONTINUED | OUTPATIENT
Start: 2022-07-22 | End: 2022-07-22

## 2022-07-22 RX ORDER — PANTOPRAZOLE SODIUM 20 MG/1
40 TABLET, DELAYED RELEASE ORAL
Refills: 0 | Status: DISCONTINUED | OUTPATIENT
Start: 2022-07-22 | End: 2022-07-23

## 2022-07-22 RX ORDER — METHADONE HYDROCHLORIDE 40 MG/1
5 TABLET ORAL EVERY 12 HOURS
Refills: 0 | Status: CANCELLED | OUTPATIENT
Start: 2022-07-25 | End: 2022-07-23

## 2022-07-22 RX ADMIN — PANTOPRAZOLE SODIUM 40 MILLIGRAM(S): 20 TABLET, DELAYED RELEASE ORAL at 05:36

## 2022-07-22 RX ADMIN — Medication 50 MILLIGRAM(S): at 06:25

## 2022-07-22 RX ADMIN — Medication 25 MILLIGRAM(S): at 19:08

## 2022-07-22 RX ADMIN — Medication 25 MILLIGRAM(S): at 14:36

## 2022-07-22 RX ADMIN — Medication 25 MILLIGRAM(S): at 22:05

## 2022-07-22 RX ADMIN — Medication 4 MILLIGRAM(S): at 20:20

## 2022-07-22 RX ADMIN — Medication 25 MILLIGRAM(S): at 17:39

## 2022-07-22 RX ADMIN — Medication 25 MILLIGRAM(S): at 10:07

## 2022-07-22 RX ADMIN — Medication 1 MILLIGRAM(S): at 11:20

## 2022-07-22 RX ADMIN — MIDAZOLAM HYDROCHLORIDE 4 MILLIGRAM(S): 1 INJECTION, SOLUTION INTRAMUSCULAR; INTRAVENOUS at 02:05

## 2022-07-22 RX ADMIN — Medication 2 MILLIGRAM(S): at 05:36

## 2022-07-22 RX ADMIN — ENOXAPARIN SODIUM 40 MILLIGRAM(S): 100 INJECTION SUBCUTANEOUS at 20:26

## 2022-07-22 RX ADMIN — METHADONE HYDROCHLORIDE 15 MILLIGRAM(S): 40 TABLET ORAL at 17:39

## 2022-07-22 RX ADMIN — Medication 100 MILLIGRAM(S): at 11:20

## 2022-07-22 NOTE — PROGRESS NOTE ADULT - SUBJECTIVE AND OBJECTIVE BOX
Patient seen and evaluated this am, sedated on Precedex      T(F): 97 (07-22-22 @ 11:00), Max: 98.7 (07-21-22 @ 17:57)  HR: 95 (07-22-22 @ 14:00)  BP: 146/74 (07-22-22 @ 14:00)  RR: 26 (07-22-22 @ 14:00)  SpO2: 98% (07-22-22 @ 14:00) (95% - 99%)    PHYSICAL EXAM:  GENERAL: NAD  HEAD:  Atraumatic, Normocephalic  EYES: EOMI, PERRLA, conjunctiva and sclera clear  NERVOUS SYSTEM: no focal deficits   CHEST/LUNG: Clear to percussion bilaterally; No rales, rhonchi, wheezing, or rubs  HEART: Regular rate and rhythm; No murmurs, rubs, or gallops  ABDOMEN: Soft, Nontender, Nondistended; Bowel sounds present  EXTREMITIES:  2+ Peripheral Pulses, No clubbing, cyanosis, or edema    LABS  07-22    137  |  102  |  12  ----------------------------<  109<H>  5.0   |  25  |  1.0    Ca    9.1      22 Jul 2022 12:01  Phos  3.3     07-22  Mg     2.0     07-22    TPro  6.6  /  Alb  4.1  /  TBili  0.7  /  DBili  x   /  AST  25  /  ALT  42<H>  /  AlkPhos  90  07-22                          12.7   11.23 )-----------( 254      ( 22 Jul 2022 07:26 )             39.2         CARDIAC ENZYMES  Creatine Kinase, Serum: 457 (07-21 @ 12:51)      Troponin T, Serum: <0.01 ng/mL (07-22-22 @ 12:01)    urine tox positive for -> Fentanyl, benzo, methadone and amphetamine    RADIOLOGY  < from: CT Head No Cont (07.21.22 @ 15:24) >  IMPRESSION:    No acute intracranial pathology, stable exam since 6/11/2020.    < end of copied text >  < from: Xray Chest 1 View-PORTABLE IMMEDIATE (Xray Chest 1 View-PORTABLE IMMEDIATE .) (07.21.22 @ 14:16) >    Impression:    Mild pulmonary vascular congestion. No focal infiltrate.      < end of copied text >    MEDICATIONS  (STANDING):    chlordiazePOXIDE 25 milliGRAM(s) Oral every 4 hours  chlorhexidine 2% Cloths 1 Application(s) Topical <User Schedule>  dexMEDEtomidine Infusion 0.4 MICROgram(s)/kG/Hr (10.9 mL/Hr) IV Continuous <Continuous>  enoxaparin Injectable 40 milliGRAM(s) SubCutaneous every 24 hours  folic acid 1 milliGRAM(s) Oral daily  methadone    Tablet 15 milliGRAM(s) Oral every 12 hours  pantoprazole    Tablet 40 milliGRAM(s) Oral before breakfast  thiamine 100 milliGRAM(s) Oral daily    MEDICATIONS  (PRN):  chlordiazePOXIDE 25 milliGRAM(s) Oral every 4 hours PRN Benzo withdrawal/barbiturates  diazepam  Injectable 10 milliGRAM(s) IV Push every 4 hours PRN agitation/withdrawal

## 2022-07-22 NOTE — CONSULT NOTE ADULT - PROBLEM SELECTOR RECOMMENDATION 9
After evaluation at this time would initiate methadone taper and benzodiazipine taper to cover all his substance abuse. Can continue with precedex for agitation as well.  Pt will be monitored and supportive care provided.  Pt is cooperative at this time but monitor for agitation and use of PRN meds. Pt is agreeable to aftercare.  CATCH team involved for aftercare and pt will follow up with aftercare for rehab.  Case discussed with Dr. Duarte After evaluation at this time would initiate methadone taper and benzodiazipine taper to cover all his substance abuse. PRN Librium PO for agitation and withdrawal or IV diazepam for agitation/withdrawal. Can continue with precedex for agitation as well.  Pt will be monitored and supportive care provided.  Pt is cooperative at this time but monitor for agitation and use of PRN meds. Pt is agreeable to aftercare.  CATCH team involved for aftercare and pt will follow up with aftercare for rehab.  Case discussed with Dr. Duarte

## 2022-07-22 NOTE — CONSULT NOTE ADULT - SUBJECTIVE AND OBJECTIVE BOX
Pt interviewed, examined and EMR chart reviewed.  Pt evaluation for polysubstabce abuse.  Pt admits to using multiple drugs. Pt states his drug of choice was oxy but now heroine. He uses about 1 bundle per day inhaled. Last use day of admission. Pt has been on methadone program in past does not want to go on at this time. Pt also did not like suboxone treatment and is refusing that at this time. Pt states his second drug is adderall up to 10 pills per dayof 30mg tablets. Pt then takes benzodiazipines xanax or klonopin at night to come down from the adderall. Pt states occasional cocaine use as well.  Pt denies any alcohol dependency.  Pt completed rehab at [place] about 1 year ago.   Hx of withdrawal but denies any Seizures.  variable periods of sobriety in the past.    Has been in detox before ___X__yes,   _____No    SOCIAL HISTORY:    REVIEW OF SYSTEMS:    Constitutional: No fever, weight loss or fatigue  ENT:  No difficulty hearing, tinnitus, vertigo; No sinus or throat pain  Neck: No pain or stiffness  Respiratory: No cough, wheezing, chills or hemoptysis  Cardiovascular: No chest pain, palpitations, shortness of breath, dizziness or leg swelling  Gastrointestinal: No abdominal or epigastric pain. No nausea, vomiting or hematemesis; No diarrhea or constipation. No melena or hematochezia.  Neurological: No headaches, memory loss, loss of strength, numbness or tremors  Musculoskeletal: No joint pain or swelling; No muscle, back or extremity pain  Psychiatric: No depression, anxiety, mood swings or difficulty sleeping    MEDICATIONS  (STANDING):  chlordiazePOXIDE 50 milliGRAM(s) Oral every 6 hours  chlorhexidine 2% Cloths 1 Application(s) Topical <User Schedule>  dexMEDEtomidine Infusion 0.4 MICROgram(s)/kG/Hr (10.9 mL/Hr) IV Continuous <Continuous>  folic acid 1 milliGRAM(s) Oral daily  heparin   Injectable 5000 Unit(s) SubCutaneous every 12 hours  pantoprazole    Tablet 40 milliGRAM(s) Oral before breakfast  thiamine 100 milliGRAM(s) Oral daily    MEDICATIONS  (PRN):  LORazepam     Tablet 2 milliGRAM(s) Oral every 2 hours PRN Symptom-triggered 2 point increase in CIWA-Ar  LORazepam   Injectable 2 milliGRAM(s) IV Push every 1 hour PRN Symptom-triggered: each CIWA -Ar score 8 or GREATER      Vital Signs Last 24 Hrs  T(C): 36.1 (22 Jul 2022 07:11), Max: 38.3 (21 Jul 2022 13:44)  T(F): 97 (22 Jul 2022 07:11), Max: 101 (21 Jul 2022 13:44)  HR: 92 (22 Jul 2022 09:00) (80 - 142)  BP: 153/81 (22 Jul 2022 09:00) (131/77 - 193/84)  BP(mean): 108 (22 Jul 2022 09:00) (98 - 116)  RR: 16 (22 Jul 2022 09:00) (16 - 20)  SpO2: 97% (22 Jul 2022 09:00) (95% - 98%)    Parameters below as of 22 Jul 2022 09:00  Patient On (Oxygen Delivery Method): room air        PHYSICAL EXAM:    Constitutional: NAD, well-groomed, well-developed  HEENT: PERRLA, EOMI, Normal Hearing, MMM  Neck: No LAD, No JVD  Back: Normal spine flexure, No CVA tenderness  Respiratory: CTAB/L  Cardiovascular: S1 and S2, RRR, no M/G/R  Gastrointestinal: BS+, soft, NT/ND  Extremities: No peripheral edema  Vascular: 2+ peripheral pulses  Neurological: A/O x 3, no focal deficits    LABS:                        12.7   11.23 )-----------( 254      ( 22 Jul 2022 07:26 )             39.2     07-22    139  |  101  |  12  ----------------------------<  96  4.5   |  26  |  1.1    Ca    9.2      22 Jul 2022 07:26  Phos  3.3     07-22  Mg     2.0     07-22    TPro  6.4  /  Alb  4.2  /  TBili  0.5  /  DBili  <0.2  /  AST  33  /  ALT  47<H>  /  AlkPhos  98  07-21      Urinalysis Basic - ( 21 Jul 2022 17:45 )    Color: Yellow / Appearance: Clear / SG: >=1.030 / pH: x  Gluc: x / Ketone: Negative  / Bili: Negative / Urobili: 0.2 mg/dL   Blood: x / Protein: Negative mg/dL / Nitrite: Negative   Leuk Esterase: Negative / RBC: x / WBC x   Sq Epi: x / Non Sq Epi: x / Bacteria: x      Drug Screen Urine:  Alcohol Level  Alcohol, Blood: <10 mg/dL (07-21-22 @ 12:21)        RADIOLOGY & ADDITIONAL STUDIES:

## 2022-07-22 NOTE — PATIENT PROFILE ADULT - FALL HARM RISK - RISK INTERVENTIONS
Assistance OOB with selected safe patient handling equipment/Assistance with ambulation/Communicate Fall Risk and Risk Factors to all staff, patient, and family/Monitor for mental status changes/Monitor gait and stability/Reinforce activity limits and safety measures with patient and family/Toileting schedule using arm’s reach rule for commode and bathroom/Use of alarms - bed, chair and/or voice tab/Visual Cue: Yellow wristband/Bed in lowest position, wheels locked, appropriate side rails in place/Call bell, personal items and telephone in reach/Instruct patient to call for assistance before getting out of bed or chair/Non-slip footwear when patient is out of bed/[place] to call system/Physically safe environment - no spills, clutter or unnecessary equipment/Purposeful Proactive Rounding/Room/bathroom lighting operational, light cord in reach

## 2022-07-22 NOTE — CONSULT NOTE ADULT - ASSESSMENT
IMPRESSION:  Polysubstance abuse/ withdrawal  on Detox regimen by Addiction     PLAN:    CNS: Taper per addiction , wean precedex as tolerated    HEENT: Oral care    PULMONARY:  HOB @ 45 degrees.  Aspiration precautions     CARDIOVASCULAR: Keep I=O . TTE , CE x3     GI: GI prophylaxis.  Feeding.  Bowel regimen     RENAL:  Follow up lytes.  Correct as needed    INFECTIOUS DISEASE: Follow up cultures , HIV , hepatitis panel    HEMATOLOGICAL:  DVT prophylaxis. Dimers     ENDOCRINE:  Follow up FS.  Insulin protocol if needed.    MUSCULOSKELETAL: Bed rest    Crit until on precedex        IMPRESSION:  toxic encephalopathy  Polysubstance abuse/ withdrawal  on Detox regimen by Addiction     PLAN:    CNS: Taper per addiction , wean precedex as tolerated    HEENT: Oral care    PULMONARY:  HOB @ 45 degrees.  Aspiration precautions     CARDIOVASCULAR: Keep I=O . TTE , CE x3     GI: GI prophylaxis.  Feeding.  Bowel regimen     RENAL:  Follow up lytes.  Correct as needed    INFECTIOUS DISEASE: Follow up cultures , HIV , hepatitis panel    HEMATOLOGICAL:  DVT prophylaxis. Dimers     ENDOCRINE:  Follow up FS.  Insulin protocol if needed.    MUSCULOSKELETAL: Bed rest    Crit until on precedex

## 2022-07-22 NOTE — CONSULT NOTE ADULT - ATTENDING COMMENTS
Attending Statement: I have personally performed a face to face diagnostic evaluation on this patient. The patient is suffering from:  toxic encephalopathy  Polysubstance abuse/ withdrawal  I have made amendments to the documentation where necessary. I have personally seen and examined this patient.  I have fully participated in the care of this patient.  I have reviewed all pertinent clinical information, including history, physical exam, plan and note.

## 2022-07-22 NOTE — CONSULT NOTE ADULT - SUBJECTIVE AND OBJECTIVE BOX
Patient is a 42y old  Male who presents with a chief complaint of Drug withdrawal (22 Jul 2022 09:20)      HPI:  HPI:42 year old man presents to the ED for evaluation of withdrawal symptoms. Patient has been taking street methadone, snorting cocaine and heroin 1 bundle per day (10 bags). He also taking unknown benzodiazepine with last usage last night. Patient c/o tremors and palpitations. Patient denies any SI/HI.  no vomiting , diarrhea or abdominal pain. Patient denies any dizziness or falls.Patient denies any alcohol usage. no fevers. patient with productive cough but denies any sob, hemoptysis. no chest pain, headaches. patient with decreased po intake.   (21 Jul 2022 17:46)      PAST MEDICAL & SURGICAL HISTORY:  Polysubstance abuse      No significant past surgical history          SOCIAL HX:   Smoking     -ve                FAMILY HISTORY:  No pertinent family history in first degree relatives    :  No known cardiovacular family hisotry     Review Of Systems:     All ROS are negative except per HPI       Allergies    No Known Allergies    Intolerances          PHYSICAL EXAM    ICU Vital Signs Last 24 Hrs  T(C): 36.1 (22 Jul 2022 07:11), Max: 38.3 (21 Jul 2022 13:44)  T(F): 97 (22 Jul 2022 07:11), Max: 101 (21 Jul 2022 13:44)  HR: 92 (22 Jul 2022 09:00) (80 - 142)  BP: 153/81 (22 Jul 2022 09:00) (131/77 - 193/84)  BP(mean): 108 (22 Jul 2022 09:00) (98 - 116)  RR: 16 (22 Jul 2022 09:00) (16 - 20)  SpO2: 97% (22 Jul 2022 09:00) (95% - 98%)    O2 Parameters below as of 22 Jul 2022 09:00  Patient On (Oxygen Delivery Method): room air            CONSTITUTIONAL:  ill appearing   Diaphrotic     ENT:   Airway patent,   Mouth with normal mucosa.   No thrush      CARDIAC:   Normal rate,   Regular rhythm.    No edema      Vascular:   normal systolic impulse  no bruits    RESPIRATORY:   No wheezing  Bilateral BS   Not tachypneic,  No use of accessory muscles    GASTROINTESTINAL:  Abdomen soft,   Non-tender,   No guarding,   + BS      NEUROLOGICAL:   sedated                  07-21-22 @ 07:01  -  07-22-22 @ 07:00  --------------------------------------------------------  IN:    Dexmedetomidine: 5 mL  Total IN: 5 mL    OUT:    Indwelling Catheter - Urethral (mL): 4275 mL  Total OUT: 4275 mL    Total NET: -4270 mL      07-22-22 @ 07:01  -  07-22-22 @ 09:35  --------------------------------------------------------  IN:    Dexmedetomidine: 10 mL  Total IN: 10 mL    OUT:    Indwelling Catheter - Urethral (mL): 700 mL  Total OUT: 700 mL    Total NET: -690 mL          LABS:                          12.7   11.23 )-----------( 254      ( 22 Jul 2022 07:26 )             39.2                                               07-22    139  |  101  |  12  ----------------------------<  96  4.5   |  26  |  1.1    Ca    9.2      22 Jul 2022 07:26  Phos  3.3     07-22  Mg     2.0     07-22    TPro  6.4  /  Alb  4.2  /  TBili  0.5  /  DBili  <0.2  /  AST  33  /  ALT  47<H>  /  AlkPhos  98  07-21                                             Urinalysis Basic - ( 21 Jul 2022 17:45 )    Color: Yellow / Appearance: Clear / SG: >=1.030 / pH: x  Gluc: x / Ketone: Negative  / Bili: Negative / Urobili: 0.2 mg/dL   Blood: x / Protein: Negative mg/dL / Nitrite: Negative   Leuk Esterase: Negative / RBC: x / WBC x   Sq Epi: x / Non Sq Epi: x / Bacteria: x        CARDIAC MARKERS ( 21 Jul 2022 12:51 )  x     / x     / 457 U/L / x     / x                                                LIVER FUNCTIONS - ( 21 Jul 2022 19:18 )  Alb: 4.2 g/dL / Pro: 6.4 g/dL / ALK PHOS: 98 U/L / ALT: 47 U/L / AST: 33 U/L / GGT: x                                                                                                                                       X-Rays reviewed                                                                          CXR interpreted by me     MEDICATIONS  (STANDING):  chlorhexidine 2% Cloths 1 Application(s) Topical <User Schedule>  dexMEDEtomidine Infusion 0.4 MICROgram(s)/kG/Hr (10.9 mL/Hr) IV Continuous <Continuous>  folic acid 1 milliGRAM(s) Oral daily  heparin   Injectable 5000 Unit(s) SubCutaneous every 12 hours  pantoprazole    Tablet 40 milliGRAM(s) Oral before breakfast  thiamine 100 milliGRAM(s) Oral daily    MEDICATIONS  (PRN):         Patient is a 42y old  Male who presents with a chief complaint of Drug withdrawal (22 Jul 2022 09:20)      HPI:  HPI:42 year old man presents to the ED for evaluation of withdrawal symptoms. Patient has been taking street methadone, snorting cocaine and heroin 1 bundle per day (10 bags). He also taking unknown benzodiazepine with last usage last night. Patient c/o tremors and palpitations. Patient denies any SI/HI.  no vomiting , diarrhea or abdominal pain. Patient denies any dizziness or falls.Patient denies any alcohol usage. no fevers. patient with productive cough but denies any sob, hemoptysis. no chest pain, headaches. patient with decreased po intake.   (21 Jul 2022 17:46)      PAST MEDICAL & SURGICAL HISTORY:  Polysubstance abuse      No significant past surgical history          SOCIAL HX:   Smoking     -ve                FAMILY HISTORY:  No pertinent family history in first degree relatives    :  No known cardiovacular family hisotry     Review Of Systems:     All ROS are negative except per HPI       Allergies    No Known Allergies    Intolerances          PHYSICAL EXAM    ICU Vital Signs Last 24 Hrs  T(C): 36.1 (22 Jul 2022 07:11), Max: 38.3 (21 Jul 2022 13:44)  T(F): 97 (22 Jul 2022 07:11), Max: 101 (21 Jul 2022 13:44)  HR: 92 (22 Jul 2022 09:00) (80 - 142)  BP: 153/81 (22 Jul 2022 09:00) (131/77 - 193/84)  BP(mean): 108 (22 Jul 2022 09:00) (98 - 116)  RR: 16 (22 Jul 2022 09:00) (16 - 20)  SpO2: 97% (22 Jul 2022 09:00) (95% - 98%)    O2 Parameters below as of 22 Jul 2022 09:00  Patient On (Oxygen Delivery Method): room air        CONSTITUTIONAL:  ill appearing   Diaphrotic     ENT:   Airway patent,   Mouth with normal mucosa.   No thrush      CARDIAC:   Normal rate,   Regular rhythm.    No edema      Vascular:   normal systolic impulse  no bruits    RESPIRATORY:   No wheezing  Bilateral BS   Not tachypneic,  No use of accessory muscles    GASTROINTESTINAL:  Abdomen soft,   Non-tender,   No guarding,   + BS      NEUROLOGICAL:   sedated                  07-21-22 @ 07:01  -  07-22-22 @ 07:00  --------------------------------------------------------  IN:    Dexmedetomidine: 5 mL  Total IN: 5 mL    OUT:    Indwelling Catheter - Urethral (mL): 4275 mL  Total OUT: 4275 mL    Total NET: -4270 mL      07-22-22 @ 07:01  -  07-22-22 @ 09:35  --------------------------------------------------------  IN:    Dexmedetomidine: 10 mL  Total IN: 10 mL    OUT:    Indwelling Catheter - Urethral (mL): 700 mL  Total OUT: 700 mL    Total NET: -690 mL          LABS:                          12.7   11.23 )-----------( 254      ( 22 Jul 2022 07:26 )             39.2                                               07-22    139  |  101  |  12  ----------------------------<  96  4.5   |  26  |  1.1    Ca    9.2      22 Jul 2022 07:26  Phos  3.3     07-22  Mg     2.0     07-22    TPro  6.4  /  Alb  4.2  /  TBili  0.5  /  DBili  <0.2  /  AST  33  /  ALT  47<H>  /  AlkPhos  98  07-21                                             Urinalysis Basic - ( 21 Jul 2022 17:45 )    Color: Yellow / Appearance: Clear / SG: >=1.030 / pH: x  Gluc: x / Ketone: Negative  / Bili: Negative / Urobili: 0.2 mg/dL   Blood: x / Protein: Negative mg/dL / Nitrite: Negative   Leuk Esterase: Negative / RBC: x / WBC x   Sq Epi: x / Non Sq Epi: x / Bacteria: x        CARDIAC MARKERS ( 21 Jul 2022 12:51 )  x     / x     / 457 U/L / x     / x                                                LIVER FUNCTIONS - ( 21 Jul 2022 19:18 )  Alb: 4.2 g/dL / Pro: 6.4 g/dL / ALK PHOS: 98 U/L / ALT: 47 U/L / AST: 33 U/L / GGT: x                                                                                                                                   X-Rays reviewed                                                                          CXR interpreted by me     MEDICATIONS  (STANDING):  chlorhexidine 2% Cloths 1 Application(s) Topical <User Schedule>  dexMEDEtomidine Infusion 0.4 MICROgram(s)/kG/Hr (10.9 mL/Hr) IV Continuous <Continuous>  folic acid 1 milliGRAM(s) Oral daily  heparin   Injectable 5000 Unit(s) SubCutaneous every 12 hours  pantoprazole    Tablet 40 milliGRAM(s) Oral before breakfast  thiamine 100 milliGRAM(s) Oral daily    MEDICATIONS  (PRN):

## 2022-07-22 NOTE — PROGRESS NOTE ADULT - SUBJECTIVE AND OBJECTIVE BOX
Hospital Day:  1d    Subjective: Patient is a 42y old  Male who presents with a chief complaint of Drug withdrawal (22 Jul 2022 09:34)      Pt seen and evaluated at bedside.   Complaints: none; drowsy due to precedex; wants to go to rehab   Over the night Events: none    Past Medical Hx:   No pertinent past medical history    Polysubstance abuse      Past Sx:  No significant past surgical history      Allergies:  No Known Allergies    Current Meds:   Standng Meds:  chlordiazePOXIDE   Oral   chlordiazePOXIDE 25 milliGRAM(s) Oral every 4 hours  chlorhexidine 2% Cloths 1 Application(s) Topical <User Schedule>  dexMEDEtomidine Infusion 0.4 MICROgram(s)/kG/Hr (10.9 mL/Hr) IV Continuous <Continuous>  folic acid 1 milliGRAM(s) Oral daily  heparin   Injectable 5000 Unit(s) SubCutaneous every 12 hours  methadone    Tablet   Oral   methadone    Tablet 15 milliGRAM(s) Oral every 12 hours  pantoprazole    Tablet 40 milliGRAM(s) Oral before breakfast  thiamine 100 milliGRAM(s) Oral daily    PRN Meds:  chlordiazePOXIDE 25 milliGRAM(s) Oral every 4 hours PRN Benzo withdrawal/barbiturates  diazepam  Injectable 10 milliGRAM(s) IV Push every 4 hours PRN agitation/withdrawal      Vital Signs:   T(F): 97 (07-22-22 @ 11:00), Max: 101 (07-21-22 @ 13:44)  HR: 83 (07-22-22 @ 11:00) (80 - 142)  BP: 147/72 (07-22-22 @ 11:00) (131/77 - 193/84)  RR: 15 (07-22-22 @ 11:00) (15 - 20)  SpO2: 99% (07-22-22 @ 11:00) (95% - 99%)    Physical Exam:   GENERAL: NAD, Resting in bed  HEENT: NCAT  CHEST/LUNG: Clear to auscultation bilaterally; No wheezing or rubs.   HEART: Regular rate and rhythm; No murmurs, rubs, or gallops  ABDOMEN: Bowel sounds present; Soft, Nontender, Nondistended.   EXTREMITIES:  No clubbing, cyanosis, or edema  NERVOUS SYSTEM:  Alert & Oriented X3    FLUID BALANCE    07-21-22 @ 07:01  -  07-22-22 @ 07:00  --------------------------------------------------------  IN: 5 mL / OUT: 4275 mL / NET: -4270 mL    07-22-22 @ 07:01  -  07-22-22 @ 11:55  --------------------------------------------------------  IN: 10 mL / OUT: 1250 mL / NET: -1240 mL        Labs:                         12.7   11.23 )-----------( 254      ( 22 Jul 2022 07:26 )             39.2     Neutophil% 68.4, Lymphocyte% 17.2, Monocyte% 9.2, Bands% 0.4 07-21-22 @ 12:21    22 Jul 2022 07:26    139    |  101    |  12     ----------------------------<  96     4.5     |  26     |  1.1      Ca    9.2        22 Jul 2022 07:26  Phos  3.3       22 Jul 2022 07:26  Mg     2.0       22 Jul 2022 07:26    TPro  6.4    /  Alb  4.2    /  TBili  0.5    /  DBili  <0.2   /  AST  33     /  ALT  47     /  AlkPhos  98     21 Jul 2022 19:18              Troponin --, CKMB --,  07-21-22 @ 12:51        Urinalysis Basic - ( 21 Jul 2022 17:45 )    Color: Yellow / Appearance: Clear / SG: >=1.030 / pH: x  Gluc: x / Ketone: Negative  / Bili: Negative / Urobili: 0.2 mg/dL   Blood: x / Protein: Negative mg/dL / Nitrite: Negative   Leuk Esterase: Negative / RBC: x / WBC x   Sq Epi: x / Non Sq Epi: x / Bacteria: x                      Radiology:

## 2022-07-23 ENCOUNTER — INPATIENT (INPATIENT)
Facility: HOSPITAL | Age: 42
LOS: 1 days | Discharge: LEFT BEFORE DISCHARGE | End: 2022-07-25
Attending: INTERNAL MEDICINE | Admitting: INTERNAL MEDICINE

## 2022-07-23 ENCOUNTER — TRANSCRIPTION ENCOUNTER (OUTPATIENT)
Age: 42
End: 2022-07-23

## 2022-07-23 VITALS
OXYGEN SATURATION: 98 % | TEMPERATURE: 98 F | HEART RATE: 108 BPM | SYSTOLIC BLOOD PRESSURE: 149 MMHG | WEIGHT: 235.01 LBS | HEIGHT: 75 IN | DIASTOLIC BLOOD PRESSURE: 87 MMHG | RESPIRATION RATE: 20 BRPM

## 2022-07-23 VITALS — OXYGEN SATURATION: 98 % | RESPIRATION RATE: 16 BRPM

## 2022-07-23 LAB
ALBUMIN SERPL ELPH-MCNC: 4.1 G/DL — SIGNIFICANT CHANGE UP (ref 3.5–5.2)
ALBUMIN SERPL ELPH-MCNC: 4.8 G/DL — SIGNIFICANT CHANGE UP (ref 3.5–5.2)
ALP SERPL-CCNC: 90 U/L — SIGNIFICANT CHANGE UP (ref 30–115)
ALP SERPL-CCNC: 97 U/L — SIGNIFICANT CHANGE UP (ref 30–115)
ALT FLD-CCNC: 41 U/L — SIGNIFICANT CHANGE UP (ref 0–41)
ALT FLD-CCNC: 47 U/L — HIGH (ref 0–41)
ANION GAP SERPL CALC-SCNC: 13 MMOL/L — SIGNIFICANT CHANGE UP (ref 7–14)
ANION GAP SERPL CALC-SCNC: 13 MMOL/L — SIGNIFICANT CHANGE UP (ref 7–14)
APAP SERPL-MCNC: <5 UG/ML — LOW (ref 10–30)
AST SERPL-CCNC: 26 U/L — SIGNIFICANT CHANGE UP (ref 0–41)
AST SERPL-CCNC: 38 U/L — SIGNIFICANT CHANGE UP (ref 0–41)
BASOPHILS # BLD AUTO: 0.06 K/UL — SIGNIFICANT CHANGE UP (ref 0–0.2)
BASOPHILS # BLD AUTO: 0.07 K/UL — SIGNIFICANT CHANGE UP (ref 0–0.2)
BASOPHILS NFR BLD AUTO: 0.5 % — SIGNIFICANT CHANGE UP (ref 0–1)
BASOPHILS NFR BLD AUTO: 0.6 % — SIGNIFICANT CHANGE UP (ref 0–1)
BILIRUB SERPL-MCNC: 0.5 MG/DL — SIGNIFICANT CHANGE UP (ref 0.2–1.2)
BILIRUB SERPL-MCNC: 0.7 MG/DL — SIGNIFICANT CHANGE UP (ref 0.2–1.2)
BUN SERPL-MCNC: 10 MG/DL — SIGNIFICANT CHANGE UP (ref 10–20)
BUN SERPL-MCNC: 14 MG/DL — SIGNIFICANT CHANGE UP (ref 10–20)
CALCIUM SERPL-MCNC: 9.4 MG/DL — SIGNIFICANT CHANGE UP (ref 8.5–10.1)
CALCIUM SERPL-MCNC: 9.7 MG/DL — SIGNIFICANT CHANGE UP (ref 8.5–10.1)
CHLORIDE SERPL-SCNC: 102 MMOL/L — SIGNIFICANT CHANGE UP (ref 98–110)
CHLORIDE SERPL-SCNC: 96 MMOL/L — LOW (ref 98–110)
CO2 SERPL-SCNC: 24 MMOL/L — SIGNIFICANT CHANGE UP (ref 17–32)
CO2 SERPL-SCNC: 26 MMOL/L — SIGNIFICANT CHANGE UP (ref 17–32)
CREAT SERPL-MCNC: 1 MG/DL — SIGNIFICANT CHANGE UP (ref 0.7–1.5)
CREAT SERPL-MCNC: 1.7 MG/DL — HIGH (ref 0.7–1.5)
EGFR: 52 ML/MIN/1.73M2 — LOW
EGFR: 98 ML/MIN/1.73M2 — SIGNIFICANT CHANGE UP
EOSINOPHIL # BLD AUTO: 0.28 K/UL — SIGNIFICANT CHANGE UP (ref 0–0.7)
EOSINOPHIL # BLD AUTO: 0.3 K/UL — SIGNIFICANT CHANGE UP (ref 0–0.7)
EOSINOPHIL NFR BLD AUTO: 2.1 % — SIGNIFICANT CHANGE UP (ref 0–8)
EOSINOPHIL NFR BLD AUTO: 2.6 % — SIGNIFICANT CHANGE UP (ref 0–8)
ETHANOL SERPL-MCNC: <10 MG/DL — SIGNIFICANT CHANGE UP
GLUCOSE SERPL-MCNC: 107 MG/DL — HIGH (ref 70–99)
GLUCOSE SERPL-MCNC: 71 MG/DL — SIGNIFICANT CHANGE UP (ref 70–99)
HAV IGM SER-ACNC: SIGNIFICANT CHANGE UP
HBV CORE IGM SER-ACNC: SIGNIFICANT CHANGE UP
HBV SURFACE AG SER-ACNC: SIGNIFICANT CHANGE UP
HCT VFR BLD CALC: 41.7 % — LOW (ref 42–52)
HCT VFR BLD CALC: 44.6 % — SIGNIFICANT CHANGE UP (ref 42–52)
HCV AB S/CO SERPL IA: 0.3 S/CO — SIGNIFICANT CHANGE UP (ref 0–0.99)
HCV AB SERPL-IMP: SIGNIFICANT CHANGE UP
HGB BLD-MCNC: 13.5 G/DL — LOW (ref 14–18)
HGB BLD-MCNC: 14.6 G/DL — SIGNIFICANT CHANGE UP (ref 14–18)
HIV 1+2 AB+HIV1 P24 AG SERPL QL IA: SIGNIFICANT CHANGE UP
IMM GRANULOCYTES NFR BLD AUTO: 0.4 % — HIGH (ref 0.1–0.3)
IMM GRANULOCYTES NFR BLD AUTO: 0.5 % — HIGH (ref 0.1–0.3)
LYMPHOCYTES # BLD AUTO: 1.66 K/UL — SIGNIFICANT CHANGE UP (ref 1.2–3.4)
LYMPHOCYTES # BLD AUTO: 15.3 % — LOW (ref 20.5–51.1)
LYMPHOCYTES # BLD AUTO: 2.97 K/UL — SIGNIFICANT CHANGE UP (ref 1.2–3.4)
LYMPHOCYTES # BLD AUTO: 20.8 % — SIGNIFICANT CHANGE UP (ref 20.5–51.1)
MAGNESIUM SERPL-MCNC: 2.1 MG/DL — SIGNIFICANT CHANGE UP (ref 1.8–2.4)
MCHC RBC-ENTMCNC: 27.1 PG — SIGNIFICANT CHANGE UP (ref 27–31)
MCHC RBC-ENTMCNC: 27.2 PG — SIGNIFICANT CHANGE UP (ref 27–31)
MCHC RBC-ENTMCNC: 32.4 G/DL — SIGNIFICANT CHANGE UP (ref 32–37)
MCHC RBC-ENTMCNC: 32.7 G/DL — SIGNIFICANT CHANGE UP (ref 32–37)
MCV RBC AUTO: 83.1 FL — SIGNIFICANT CHANGE UP (ref 80–94)
MCV RBC AUTO: 83.7 FL — SIGNIFICANT CHANGE UP (ref 80–94)
MONOCYTES # BLD AUTO: 0.78 K/UL — HIGH (ref 0.1–0.6)
MONOCYTES # BLD AUTO: 1.52 K/UL — HIGH (ref 0.1–0.6)
MONOCYTES NFR BLD AUTO: 10.6 % — HIGH (ref 1.7–9.3)
MONOCYTES NFR BLD AUTO: 7.2 % — SIGNIFICANT CHANGE UP (ref 1.7–9.3)
NEUTROPHILS # BLD AUTO: 8 K/UL — HIGH (ref 1.4–6.5)
NEUTROPHILS # BLD AUTO: 9.36 K/UL — HIGH (ref 1.4–6.5)
NEUTROPHILS NFR BLD AUTO: 65.6 % — SIGNIFICANT CHANGE UP (ref 42.2–75.2)
NEUTROPHILS NFR BLD AUTO: 73.8 % — SIGNIFICANT CHANGE UP (ref 42.2–75.2)
NRBC # BLD: 0 /100 WBCS — SIGNIFICANT CHANGE UP (ref 0–0)
NRBC # BLD: 0 /100 WBCS — SIGNIFICANT CHANGE UP (ref 0–0)
PHOSPHATE SERPL-MCNC: 2.9 MG/DL — SIGNIFICANT CHANGE UP (ref 2.1–4.9)
PLATELET # BLD AUTO: 260 K/UL — SIGNIFICANT CHANGE UP (ref 130–400)
PLATELET # BLD AUTO: 326 K/UL — SIGNIFICANT CHANGE UP (ref 130–400)
POTASSIUM SERPL-MCNC: 4.5 MMOL/L — SIGNIFICANT CHANGE UP (ref 3.5–5)
POTASSIUM SERPL-MCNC: 4.8 MMOL/L — SIGNIFICANT CHANGE UP (ref 3.5–5)
POTASSIUM SERPL-SCNC: 4.5 MMOL/L — SIGNIFICANT CHANGE UP (ref 3.5–5)
POTASSIUM SERPL-SCNC: 4.8 MMOL/L — SIGNIFICANT CHANGE UP (ref 3.5–5)
PROT SERPL-MCNC: 6.4 G/DL — SIGNIFICANT CHANGE UP (ref 6–8)
PROT SERPL-MCNC: 7.2 G/DL — SIGNIFICANT CHANGE UP (ref 6–8)
RBC # BLD: 4.98 M/UL — SIGNIFICANT CHANGE UP (ref 4.7–6.1)
RBC # BLD: 5.37 M/UL — SIGNIFICANT CHANGE UP (ref 4.7–6.1)
RBC # FLD: 15.8 % — HIGH (ref 11.5–14.5)
RBC # FLD: 16.1 % — HIGH (ref 11.5–14.5)
SALICYLATES SERPL-MCNC: <0.3 MG/DL — LOW (ref 4–30)
SARS-COV-2 RNA SPEC QL NAA+PROBE: SIGNIFICANT CHANGE UP
SODIUM SERPL-SCNC: 135 MMOL/L — SIGNIFICANT CHANGE UP (ref 135–146)
SODIUM SERPL-SCNC: 139 MMOL/L — SIGNIFICANT CHANGE UP (ref 135–146)
WBC # BLD: 10.83 K/UL — HIGH (ref 4.8–10.8)
WBC # BLD: 14.28 K/UL — HIGH (ref 4.8–10.8)
WBC # FLD AUTO: 10.83 K/UL — HIGH (ref 4.8–10.8)
WBC # FLD AUTO: 14.28 K/UL — HIGH (ref 4.8–10.8)

## 2022-07-23 PROCEDURE — 99233 SBSQ HOSP IP/OBS HIGH 50: CPT

## 2022-07-23 PROCEDURE — 99221 1ST HOSP IP/OBS SF/LOW 40: CPT

## 2022-07-23 PROCEDURE — 99285 EMERGENCY DEPT VISIT HI MDM: CPT

## 2022-07-23 PROCEDURE — 93010 ELECTROCARDIOGRAM REPORT: CPT

## 2022-07-23 RX ORDER — METHADONE HYDROCHLORIDE 40 MG/1
10 TABLET ORAL
Refills: 0 | Status: DISCONTINUED | OUTPATIENT
Start: 2022-07-23 | End: 2022-07-25

## 2022-07-23 RX ORDER — THIAMINE MONONITRATE (VIT B1) 100 MG
100 TABLET ORAL DAILY
Refills: 0 | Status: DISCONTINUED | OUTPATIENT
Start: 2022-07-23 | End: 2022-07-25

## 2022-07-23 RX ORDER — FOLIC ACID 0.8 MG
1 TABLET ORAL DAILY
Refills: 0 | Status: DISCONTINUED | OUTPATIENT
Start: 2022-07-23 | End: 2022-07-24

## 2022-07-23 RX ORDER — DIAZEPAM 5 MG
10 TABLET ORAL ONCE
Refills: 0 | Status: DISCONTINUED | OUTPATIENT
Start: 2022-07-23 | End: 2022-07-23

## 2022-07-23 RX ADMIN — Medication 20 MILLIGRAM(S): at 05:55

## 2022-07-23 RX ADMIN — Medication 20 MILLIGRAM(S): at 02:08

## 2022-07-23 RX ADMIN — PANTOPRAZOLE SODIUM 40 MILLIGRAM(S): 20 TABLET, DELAYED RELEASE ORAL at 05:55

## 2022-07-23 RX ADMIN — CHLORHEXIDINE GLUCONATE 1 APPLICATION(S): 213 SOLUTION TOPICAL at 10:23

## 2022-07-23 RX ADMIN — Medication 10 MILLIGRAM(S): at 23:06

## 2022-07-23 RX ADMIN — Medication 20 MILLIGRAM(S): at 10:24

## 2022-07-23 RX ADMIN — METHADONE HYDROCHLORIDE 10 MILLIGRAM(S): 40 TABLET ORAL at 05:55

## 2022-07-23 RX ADMIN — Medication 50 MILLIGRAM(S): at 23:06

## 2022-07-23 NOTE — ED PROVIDER NOTE - OBJECTIVE STATEMENT
42 years old male history of polysubstance abuse present complaint of detoxing from heroin and benzodiazepine.  Patient was admitted to hospital 2 days ago for detox from benzos and heroin.  Patient signed AMA earlier today because  he got very anxious and wanted to go outside for a walk.  Patient reported he started feeling more anxious, tremors after leaving the hospital so he wants to come back for more medical details.  Patient denies other drug use after she left the hospital.  Patient otherwise denies fever, chills, coughing, shortness of breath, abdominal pain, vomiting, diarrhea.

## 2022-07-23 NOTE — ED PROVIDER NOTE - PHYSICAL EXAMINATION
CONSTITUTIONAL: Well-appearing; well-nourished; in no apparent distress.   EYES: PERRL; EOM intact.  Pupils 3 mm bilaterally  ENT: Tongue Fasciculations noted  CARDIOVASCULAR: Tachycardia.  Normal S1, S2; no murmurs, rubs, or gallops.   RESPIRATORY: Normal chest excursion with respiration; breath sounds clear and equal bilaterally; no wheezes, rhonchi, or rales.  GI/: Normal bowel sounds; non-distended; non-tender; no palpable organomegaly.   MS: No evidence of trauma or deformity to extremities.  NEURO/PSYCH: A & O x 4; grossly unremarkable.  Anxious mood.  Tremors to both hands.

## 2022-07-23 NOTE — H&P ADULT - HISTORY OF PRESENT ILLNESS
43yo male with known polysubstance abuse history, recently hospitalized for treatment of heroin and benzo withdrawal (left this morning because of anxiety), presents to ER because he feels he is having renewed benzo withdrawal symptoms, in particular shaking and inability to eat. He is very hungry at this time 41yo male with known polysubstance abuse history, recently hospitalized for treatment of heroin and benzo withdrawal (left this morning because of anxiety), presents to ER because he feels he is having renewed benzo withdrawal symptoms, in particular shaking and inability to eat. Received IV valium in the ER. He is very hungry at this time

## 2022-07-23 NOTE — PROGRESS NOTE ADULT - ASSESSMENT
#toxic encephalopathy 2/2 Polysubstance abuse/ withdrawal (percoect)  - mental status back so baseline  - on CIWa and methadone taper  - should be god for discharge in AM    #transaminitis   - outpt eval with PCP    #DVT ppx  #anticipated 
42 year old man presents to the ED for evaluation of withdrawal symptoms. Patient has been taking street methadone, snorting heroin 1 bundle per day (10 bags). He also taking unknown benzodiazepine with last usage last night. Patient c/o tremors and palpitations. Patient denies any SI/HI.  no vomiting , diarrhea     polysubstance abuse with withdrawal / suspected thiamine and folate deficiency     - Precedex IV   - methadone and benzo protocol   - DVT prophylaxis   - supplement thiamine and folate
IMPRESSION:  Polysubstance abuse (xanax, heroin, cocaine)/ withdrawal  on Detox regimen by Addiction     PLAN:    CNS:   methadone taper and librium taper as per addiction   wean precedex as tolerated  urine drug screen noted    HEENT: Oral care    PULMONARY:  HOB @ 45 degrees.  Aspiration precautions     CARDIOVASCULAR: Keep I=O . TTE , CE x3     GI: GI prophylaxis.  Feeding.  Bowel regimen     RENAL:  Follow up lytes.  Correct as needed    INFECTIOUS DISEASE: Follow up cultures , HIV , hepatitis panel    HEMATOLOGICAL:  DVT prophylaxis. Dimers     ENDOCRINE:  Monitor FS.  Insulin protocol if needed.    MUSCULOSKELETAL: Bed rest    Crit until on precedex     Social work for substance abuse rehab, patient willing to go back

## 2022-07-23 NOTE — ED PROVIDER NOTE - NS ED ROS FT
Constitutional: no fever, chills, no recent weight loss, change in appetite or malaise  Eyes: no redness/discharge/pain/vision changes  ENT: no rhinorrhea/ear pain/sore throat  Cardiac: No chest pain, SOB or edema.  Respiratory: No cough or respiratory distress  GI: No nausea, vomiting, diarrhea or abdominal pain.  : No dysuria, frequency, urgency or hematuria  MS: no pain to back or extremities, no loss of ROM, no weakness  Neuro/Psych : See HPI   skin: No skin rash.  Endocrine: No history of thyroid disease or diabetes.

## 2022-07-23 NOTE — DISCHARGE NOTE PROVIDER - HOSPITAL COURSE
Patient wishes to be discharged AMA. Risks of being discharged AMA including shakes, seizures and even death explained to the patient. Patient understands these risks and still wishes to be discharged AMA. No suicidal or homicidal ideations.

## 2022-07-23 NOTE — H&P ADULT - ASSESSMENT
history of substance abuse  Benzo and heroin withdrawal (history of polysubstance abuse)    elevated creatinine - probably due to dehydration)     Leukocytosis - most likely reflects withdrawal  Benzo and heroin withdrawal (history of polysubstance abuse) - will start librium protocol along with methadone 10 mg BID with consult to addiction medicine    elevated creatinine - probably due to dehydration) - IV fluids     Leukocytosis - most likely reflects withdrawal - repeat in am

## 2022-07-23 NOTE — DISCHARGE NOTE PROVIDER - CARE PROVIDER_API CALL
Mauri Johnson  PEDIATRICS  [address] [place] [id number]  Phone: [phone]  Fax: [phone]  Follow Up Time: 1 week

## 2022-07-23 NOTE — ED ADULT TRIAGE NOTE - CHIEF COMPLAINT QUOTE
DOI: 10/08/19  Initial Treatment Date 10/10/19  Date Last Seen: 10/17/19  [facility]: other  Occupation:   Referring by: Self  Primary Care Physician: Marina Reid DO  Date informed consent signed:  Initial Visit  ABN:N/A    I have reviewed the past medical history, family history, social history, medications and allergies listed in the medical record as obtained by my nursing staff and support staff and agree with their documentation  Ana  reports that she has never smoked. She has never used smokeless tobacco.  Ana is allergic to cymbalta [duloxetine hcl]; lyrica; emollient; gabapentin; lantus; nylon; and ortho tri-cyclen [norgestimate-eth estradiol].  Advance Directive Status:None   Visit Number of Current Episode: 4      Subjective: Ana is a 53 year old female who comes in today for evaluation and treatment of her middle and lower back. Patient continues to complain of lower back and middle back pain. Patient rates her pain at a 8/10 with 10 being the worst.    Initial history:  Patient complains of pain secondary to a slip and catching herself on Tuesday but she did not fall down.  Patient has had severe lower back pain since.  Radiating symptoms reported in the left leg proximally in the hamstring area.  Patient relates she has pain with coughing, sneezing and straining to make a bowel movement.  Her legs do feel weak.  She does not report loss of bowel or bladder control.  Patient relates that she is having difficulty with walking.      Objective:  Patient is a 53-year-old female who is pleasant, coherent ×3, ambulatory with a cane and does not appear to be in visible distress.   Left ilium is posterior inferior.  Tenderness in the gluteal musculature.  L4 and L5 are spinous right.  T8 is p.r..  Passive tightness in the lumbosacral paraspinal muscles is mild.      Relevant Diagnostic Imaging/Testing:   MRI of the lumbar spine without contrast on March 18, 2015:  1. Moderate  degenerative disc disease at T11-T12. There is minimal  anterior cord flattening at this level without cord contact or significant  spinal canal stenosis.  2. Mild to moderate facet arthropathy at L4-L5.  3. No significant spinal canal or neural foraminal stenosis.    Assessment:   Patient continues to complain of middle to lower back pain.    Mild to moderate facet arthropathy at L4-L5 is noted on imaging. Segmental dysfunction in the thoracic, lumbar and pelvic regions.   1. Chronic bilateral low back pain with left-sided sciatica    2. Pain in thoracic spine    3. Segmental and somatic dysfunction of thoracic region    4. Segmental and somatic dysfunction of lumbar region    5. Segmental and somatic dysfunction of pelvic region        Complicating Factors/Co-morbidities:   Previous low back problems. Obesity.    Plan:  Patient was evaluated and treatment with  Barber flexion distraction manipulation is applied in the thoracic and lumbar spine.  Impulse adjusting instrument is utilized in the pelvis, lumbar and thoracic segments.  Treatment was fairly well tolerated.        Therapeutic Exercise/Rehab/Modalities performed today:   None.      Patient Instruction/Education:   Cryotherapy 15 minutes per hour as needed. Abdominal bracing and hip hinging exercises. Patient stated understanding of, and was in agreement with, the discussed instructions.    Goals of Care: Goal of care is to improve muscular and skeletal function and provide symptom relief.      Other treatment options discussed with patient is see her primary care provider.    Patient rates her pain post treatment at 8/10 with 10 being worst.     Patient is to return later this week for continued care and treatment of her condition consistent with plan of care.    Length of Visit: 15 min.                withdrawing from opiates and benzos  tremors and flushed

## 2022-07-23 NOTE — ED PROVIDER NOTE - NS ED ATTENDING STATEMENT MOD
This was a shared visit with the EVA. I reviewed and verified the documentation and independently performed the documented:

## 2022-07-23 NOTE — DISCHARGE NOTE PROVIDER - NSDCCPCAREPLAN_GEN_ALL_CORE_FT
PRINCIPAL DISCHARGE DIAGNOSIS  Diagnosis: Drug withdrawal  Assessment and Plan of Treatment: Pt leaving AMA

## 2022-07-23 NOTE — H&P ADULT - NSHPLABSRESULTS_GEN_ALL_CORE
14.6   14.28 )-----------( 326      ( 23 Jul 2022 23:04 )             44.6     07-23    135  |  96<L>  |  14  ----------------------------<  71  4.8   |  26  |  1.7<H>    Ca    9.7      23 Jul 2022 23:04  Phos  2.9     07-23  Mg     2.1     07-23    TPro  7.2  /  Alb  4.8  /  TBili  0.7  /  DBili  x   /  AST  38  /  ALT  47<H>  /  AlkPhos  97  07-23

## 2022-07-23 NOTE — DISCHARGE NOTE PROVIDER - NSDCFUADDINST_GEN_ALL_CORE_FT
Patient wishes to be discharged AMA. Risks of being discharged AMA including shakes, seizures and even death explained to the patient. Patient understands these risks and still wishes to be discharged AMA.

## 2022-07-23 NOTE — ED PROVIDER NOTE - CLINICAL SUMMARY MEDICAL DECISION MAKING FREE TEXT BOX
pt with [facility] polysubtance abuse (xanax, cocaine, etoh) who was originally admitted for detox, on precedex drip, ativan/methadone taper, left ama because he reports having a panic attack and needing to go outside who presents for re-admission. He reports feeling shaky, tremors. Denies auditory or visual hallucination, cp, sob, n/v/d. pt given librium and admitted for further treatment.

## 2022-07-23 NOTE — PROGRESS NOTE ADULT - SUBJECTIVE AND OBJECTIVE BOX
Patient is a 42y old  Male who presents with a chief complaint of Drug withdrawal (22 Jul 2022 14:49)      OVERNIGHT EVENTS: remained stable,     SUBJECTIVE / INTERVAL HPI: Patient seen and examined at bedside.     VITAL SIGNS:  Vital Signs Last 24 Hrs  T(C): 36.4 (23 Jul 2022 05:40), Max: 36.6 (22 Jul 2022 23:00)  T(F): 97.5 (23 Jul 2022 05:40), Max: 97.9 (22 Jul 2022 23:00)  HR: 75 (23 Jul 2022 05:40) (74 - 95)  BP: 139/86 (23 Jul 2022 05:40) (139/86 - 176/74)  BP(mean): 114 (23 Jul 2022 03:00) (91 - 145)  RR: 16 (23 Jul 2022 05:53) (15 - 29)  SpO2: 98% (23 Jul 2022 05:53) (96% - 100%)    Parameters below as of 23 Jul 2022 05:53  Patient On (Oxygen Delivery Method): room air        PHYSICAL EXAM:    General: WDWN  HEENT: NC/AT; PERRL, clear conjunctiva  Neck: supple  Cardiovascular: +S1/S2; RRR  Respiratory: CTA b/l; no W/R/R  Gastrointestinal: soft, NT/ND; +BSx4  Extremities: WWP; 2+ peripheral pulses; no edema   Neurological: AAOx3; no focal deficits    MEDICATIONS:  MEDICATIONS  (STANDING):  chlordiazePOXIDE   Oral   chlordiazePOXIDE 20 milliGRAM(s) Oral every 4 hours  chlorhexidine 2% Cloths 1 Application(s) Topical <User Schedule>  enoxaparin Injectable 40 milliGRAM(s) SubCutaneous every 24 hours  folic acid 1 milliGRAM(s) Oral daily  methadone    Tablet   Oral   methadone    Tablet 10 milliGRAM(s) Oral every 12 hours  pantoprazole    Tablet 40 milliGRAM(s) Oral before breakfast  thiamine 100 milliGRAM(s) Oral daily    MEDICATIONS  (PRN):  chlordiazePOXIDE 25 milliGRAM(s) Oral every 4 hours PRN Benzo withdrawal/barbiturates  diazepam  Injectable 10 milliGRAM(s) IV Push every 4 hours PRN agitation/withdrawal  nicotine  Polacrilex Gum 4 milliGRAM(s) Oral every 4 hours PRN Nicotine Withdrawal      ALLERGIES:  Allergies    No Known Allergies    Intolerances        LABS:                        13.5   10.83 )-----------( 260      ( 23 Jul 2022 07:59 )             41.7     07-22    137  |  102  |  12  ----------------------------<  109<H>  5.0   |  25  |  1.0    Ca    9.1      22 Jul 2022 12:01  Phos  3.3     07-22  Mg     2.0     07-22    TPro  6.6  /  Alb  4.1  /  TBili  0.7  /  DBili  x   /  AST  25  /  ALT  42<H>  /  AlkPhos  90  07-22      Urinalysis Basic - ( 21 Jul 2022 17:45 )    Color: Yellow / Appearance: Clear / SG: >=1.030 / pH: x  Gluc: x / Ketone: Negative  / Bili: Negative / Urobili: 0.2 mg/dL   Blood: x / Protein: Negative mg/dL / Nitrite: Negative   Leuk Esterase: Negative / RBC: x / WBC x   Sq Epi: x / Non Sq Epi: x / Bacteria: x

## 2022-07-24 LAB
ALBUMIN SERPL ELPH-MCNC: 4.1 G/DL — SIGNIFICANT CHANGE UP (ref 3.5–5.2)
ALP SERPL-CCNC: 89 U/L — SIGNIFICANT CHANGE UP (ref 30–115)
ALT FLD-CCNC: 37 U/L — SIGNIFICANT CHANGE UP (ref 0–41)
ANION GAP SERPL CALC-SCNC: 13 MMOL/L — SIGNIFICANT CHANGE UP (ref 7–14)
AST SERPL-CCNC: 30 U/L — SIGNIFICANT CHANGE UP (ref 0–41)
BILIRUB SERPL-MCNC: 0.5 MG/DL — SIGNIFICANT CHANGE UP (ref 0.2–1.2)
BUN SERPL-MCNC: 18 MG/DL — SIGNIFICANT CHANGE UP (ref 10–20)
CALCIUM SERPL-MCNC: 9.4 MG/DL — SIGNIFICANT CHANGE UP (ref 8.5–10.1)
CHLORIDE SERPL-SCNC: 106 MMOL/L — SIGNIFICANT CHANGE UP (ref 98–110)
CK SERPL-CCNC: 767 U/L — HIGH (ref 0–225)
CO2 SERPL-SCNC: 23 MMOL/L — SIGNIFICANT CHANGE UP (ref 17–32)
CREAT SERPL-MCNC: 1.3 MG/DL — SIGNIFICANT CHANGE UP (ref 0.7–1.5)
EGFR: 71 ML/MIN/1.73M2 — SIGNIFICANT CHANGE UP
GLUCOSE SERPL-MCNC: 81 MG/DL — SIGNIFICANT CHANGE UP (ref 70–99)
HCT VFR BLD CALC: 43.5 % — SIGNIFICANT CHANGE UP (ref 42–52)
HGB BLD-MCNC: 14.2 G/DL — SIGNIFICANT CHANGE UP (ref 14–18)
MCHC RBC-ENTMCNC: 27.1 PG — SIGNIFICANT CHANGE UP (ref 27–31)
MCHC RBC-ENTMCNC: 32.6 G/DL — SIGNIFICANT CHANGE UP (ref 32–37)
MCV RBC AUTO: 83 FL — SIGNIFICANT CHANGE UP (ref 80–94)
NRBC # BLD: 0 /100 WBCS — SIGNIFICANT CHANGE UP (ref 0–0)
PLATELET # BLD AUTO: 291 K/UL — SIGNIFICANT CHANGE UP (ref 130–400)
POTASSIUM SERPL-MCNC: 4.5 MMOL/L — SIGNIFICANT CHANGE UP (ref 3.5–5)
POTASSIUM SERPL-SCNC: 4.5 MMOL/L — SIGNIFICANT CHANGE UP (ref 3.5–5)
PROT SERPL-MCNC: 6.3 G/DL — SIGNIFICANT CHANGE UP (ref 6–8)
RBC # BLD: 5.24 M/UL — SIGNIFICANT CHANGE UP (ref 4.7–6.1)
RBC # FLD: 15.9 % — HIGH (ref 11.5–14.5)
SODIUM SERPL-SCNC: 142 MMOL/L — SIGNIFICANT CHANGE UP (ref 135–146)
WBC # BLD: 10.49 K/UL — SIGNIFICANT CHANGE UP (ref 4.8–10.8)
WBC # FLD AUTO: 10.49 K/UL — SIGNIFICANT CHANGE UP (ref 4.8–10.8)

## 2022-07-24 PROCEDURE — 99233 SBSQ HOSP IP/OBS HIGH 50: CPT

## 2022-07-24 RX ORDER — SODIUM CHLORIDE 9 MG/ML
1000 INJECTION, SOLUTION INTRAVENOUS
Refills: 0 | Status: DISCONTINUED | OUTPATIENT
Start: 2022-07-24 | End: 2022-07-25

## 2022-07-24 RX ORDER — DIAZEPAM 5 MG
4 TABLET ORAL EVERY 6 HOURS
Refills: 0 | Status: DISCONTINUED | OUTPATIENT
Start: 2022-07-24 | End: 2022-07-25

## 2022-07-24 RX ADMIN — METHADONE HYDROCHLORIDE 10 MILLIGRAM(S): 40 TABLET ORAL at 06:07

## 2022-07-24 RX ADMIN — Medication 25 MILLIGRAM(S): at 17:17

## 2022-07-24 RX ADMIN — Medication 25 MILLIGRAM(S): at 21:59

## 2022-07-24 RX ADMIN — Medication 4 MILLIGRAM(S): at 20:47

## 2022-07-24 RX ADMIN — Medication 25 MILLIGRAM(S): at 09:41

## 2022-07-24 RX ADMIN — Medication 100 MILLIGRAM(S): at 14:18

## 2022-07-24 RX ADMIN — Medication 25 MILLIGRAM(S): at 02:02

## 2022-07-24 RX ADMIN — Medication 4 MILLIGRAM(S): at 14:47

## 2022-07-24 RX ADMIN — Medication 25 MILLIGRAM(S): at 13:13

## 2022-07-24 RX ADMIN — METHADONE HYDROCHLORIDE 10 MILLIGRAM(S): 40 TABLET ORAL at 17:18

## 2022-07-24 RX ADMIN — Medication 25 MILLIGRAM(S): at 06:07

## 2022-07-24 NOTE — PROGRESS NOTE ADULT - ASSESSMENT
#toxic encephalopathy 2/2 Polysubstance abuse/ withdrawal (percoect) pt left AMA yesterday and represent to ER for withdrawal sx  - on CIWa with Librium and methadone   - IVF    #transaminitis   - outpt eval with PCP    #DVT ppx

## 2022-07-24 NOTE — PATIENT PROFILE ADULT - FALL HARM RISK - RISK INTERVENTIONS
Assistance OOB with selected safe patient handling equipment/Assistance with ambulation/Communicate Fall Risk and Risk Factors to all staff, patient, and family/Monitor for mental status changes/Monitor gait and stability/Reinforce activity limits and safety measures with patient and family/Toileting schedule using arm’s reach rule for commode and bathroom/Use of alarms - bed, chair and/or voice tab/Visual Cue: Yellow wristband/Bed in lowest position, wheels locked, appropriate side rails in place/Call bell, personal items and telephone in reach/Instruct patient to call for assistance before getting out of bed or chair/Non-slip footwear when patient is out of bed/[place] to call system/Physically safe environment - no spills, clutter or unnecessary equipment/Purposeful Proactive Rounding/Room/bathroom lighting operational, light cord in reach

## 2022-07-24 NOTE — PROGRESS NOTE ADULT - SUBJECTIVE AND OBJECTIVE BOX
Patient is a 42y old  Male who presents with a chief complaint of     OVERNIGHT EVENTS: sleeping on the bed, reports feeling tired     SUBJECTIVE / INTERVAL HPI: Patient seen and examined at bedside.     VITAL SIGNS:  Vital Signs Last 24 Hrs  T(C): 35.9 (24 Jul 2022 05:42), Max: 36.6 (23 Jul 2022 21:29)  T(F): 96.6 (24 Jul 2022 05:42), Max: 97.9 (23 Jul 2022 21:29)  HR: 67 (24 Jul 2022 05:42) (67 - 108)  BP: 124/65 (24 Jul 2022 05:42) (124/65 - 149/87)  BP(mean): --  RR: 18 (24 Jul 2022 05:42) (18 - 20)  SpO2: 99% (24 Jul 2022 06:27) (98% - 99%)    Parameters below as of 24 Jul 2022 06:27  Patient On (Oxygen Delivery Method): room air        PHYSICAL EXAM:    General: WDWN  HEENT: NC/AT; PERRL, clear conjunctiva  Neck: supple  Cardiovascular: +S1/S2; RRR  Respiratory: CTA b/l; no W/R/R  Gastrointestinal: soft, NT/ND; +BSx4  Extremities: WWP; 2+ peripheral pulses; no edema   Neurological: AAOx3; no focal deficits    MEDICATIONS:  MEDICATIONS  (STANDING):  chlordiazePOXIDE   Oral   chlordiazePOXIDE 25 milliGRAM(s) Oral every 4 hours  lactated ringers. 1000 milliLiter(s) (100 mL/Hr) IV Continuous <Continuous>  methadone    Tablet 10 milliGRAM(s) Oral two times a day  thiamine 100 milliGRAM(s) Oral daily    MEDICATIONS  (PRN):      ALLERGIES:  Allergies    No Known Allergies    Intolerances        LABS:                        14.2   10.49 )-----------( 291      ( 24 Jul 2022 08:01 )             43.5     07-24    142  |  106  |  18  ----------------------------<  81  4.5   |  23  |  1.3    Ca    9.4      24 Jul 2022 08:01  Phos  2.9     07-23  Mg     2.1     07-23    TPro  6.3  /  Alb  4.1  /  TBili  0.5  /  DBili  x   /  AST  30  /  ALT  37  /  AlkPhos  89  07-24        CAPILLARY BLOOD GLUCOSE

## 2022-07-25 VITALS
HEART RATE: 80 BPM | TEMPERATURE: 98 F | DIASTOLIC BLOOD PRESSURE: 89 MMHG | SYSTOLIC BLOOD PRESSURE: 148 MMHG | RESPIRATION RATE: 18 BRPM

## 2022-07-25 DIAGNOSIS — F19.10 OTHER PSYCHOACTIVE SUBSTANCE ABUSE, UNCOMPLICATED: ICD-10-CM

## 2022-07-25 LAB
ALBUMIN SERPL ELPH-MCNC: 4.5 G/DL — SIGNIFICANT CHANGE UP (ref 3.5–5.2)
ALP SERPL-CCNC: 99 U/L — SIGNIFICANT CHANGE UP (ref 30–115)
ALT FLD-CCNC: 38 U/L — SIGNIFICANT CHANGE UP (ref 0–41)
ANION GAP SERPL CALC-SCNC: 9 MMOL/L — SIGNIFICANT CHANGE UP (ref 7–14)
AST SERPL-CCNC: 29 U/L — SIGNIFICANT CHANGE UP (ref 0–41)
BASOPHILS # BLD AUTO: 0.08 K/UL — SIGNIFICANT CHANGE UP (ref 0–0.2)
BASOPHILS NFR BLD AUTO: 0.6 % — SIGNIFICANT CHANGE UP (ref 0–1)
BILIRUB SERPL-MCNC: 0.4 MG/DL — SIGNIFICANT CHANGE UP (ref 0.2–1.2)
BUN SERPL-MCNC: 17 MG/DL — SIGNIFICANT CHANGE UP (ref 10–20)
CALCIUM SERPL-MCNC: 9.3 MG/DL — SIGNIFICANT CHANGE UP (ref 8.5–10.1)
CHLORIDE SERPL-SCNC: 100 MMOL/L — SIGNIFICANT CHANGE UP (ref 98–110)
CK SERPL-CCNC: 727 U/L — HIGH (ref 0–225)
CO2 SERPL-SCNC: 27 MMOL/L — SIGNIFICANT CHANGE UP (ref 17–32)
CREAT SERPL-MCNC: 1.4 MG/DL — SIGNIFICANT CHANGE UP (ref 0.7–1.5)
EGFR: 65 ML/MIN/1.73M2 — SIGNIFICANT CHANGE UP
EOSINOPHIL # BLD AUTO: 0.42 K/UL — SIGNIFICANT CHANGE UP (ref 0–0.7)
EOSINOPHIL NFR BLD AUTO: 2.9 % — SIGNIFICANT CHANGE UP (ref 0–8)
GLUCOSE SERPL-MCNC: 97 MG/DL — SIGNIFICANT CHANGE UP (ref 70–99)
HCT VFR BLD CALC: 43 % — SIGNIFICANT CHANGE UP (ref 42–52)
HGB BLD-MCNC: 13.9 G/DL — LOW (ref 14–18)
IMM GRANULOCYTES NFR BLD AUTO: 0.3 % — SIGNIFICANT CHANGE UP (ref 0.1–0.3)
LYMPHOCYTES # BLD AUTO: 16.8 % — LOW (ref 20.5–51.1)
LYMPHOCYTES # BLD AUTO: 2.44 K/UL — SIGNIFICANT CHANGE UP (ref 1.2–3.4)
MAGNESIUM SERPL-MCNC: 1.9 MG/DL — SIGNIFICANT CHANGE UP (ref 1.8–2.4)
MCHC RBC-ENTMCNC: 26.9 PG — LOW (ref 27–31)
MCHC RBC-ENTMCNC: 32.3 G/DL — SIGNIFICANT CHANGE UP (ref 32–37)
MCV RBC AUTO: 83.2 FL — SIGNIFICANT CHANGE UP (ref 80–94)
MONOCYTES # BLD AUTO: 1.19 K/UL — HIGH (ref 0.1–0.6)
MONOCYTES NFR BLD AUTO: 8.2 % — SIGNIFICANT CHANGE UP (ref 1.7–9.3)
NEUTROPHILS # BLD AUTO: 10.37 K/UL — HIGH (ref 1.4–6.5)
NEUTROPHILS NFR BLD AUTO: 71.2 % — SIGNIFICANT CHANGE UP (ref 42.2–75.2)
NRBC # BLD: 0 /100 WBCS — SIGNIFICANT CHANGE UP (ref 0–0)
PLATELET # BLD AUTO: 312 K/UL — SIGNIFICANT CHANGE UP (ref 130–400)
POTASSIUM SERPL-MCNC: 4.3 MMOL/L — SIGNIFICANT CHANGE UP (ref 3.5–5)
POTASSIUM SERPL-SCNC: 4.3 MMOL/L — SIGNIFICANT CHANGE UP (ref 3.5–5)
PROT SERPL-MCNC: 6.8 G/DL — SIGNIFICANT CHANGE UP (ref 6–8)
RBC # BLD: 5.17 M/UL — SIGNIFICANT CHANGE UP (ref 4.7–6.1)
RBC # FLD: 15.4 % — HIGH (ref 11.5–14.5)
SODIUM SERPL-SCNC: 136 MMOL/L — SIGNIFICANT CHANGE UP (ref 135–146)
WBC # BLD: 14.54 K/UL — HIGH (ref 4.8–10.8)
WBC # FLD AUTO: 14.54 K/UL — HIGH (ref 4.8–10.8)

## 2022-07-25 PROCEDURE — 99232 SBSQ HOSP IP/OBS MODERATE 35: CPT

## 2022-07-25 PROCEDURE — 99221 1ST HOSP IP/OBS SF/LOW 40: CPT

## 2022-07-25 PROCEDURE — 99251: CPT

## 2022-07-25 RX ORDER — DIAZEPAM 5 MG
4 TABLET ORAL EVERY 6 HOURS
Refills: 0 | Status: DISCONTINUED | OUTPATIENT
Start: 2022-07-25 | End: 2022-07-25

## 2022-07-25 RX ORDER — DIAZEPAM 5 MG
4 TABLET ORAL EVERY 8 HOURS
Refills: 0 | Status: DISCONTINUED | OUTPATIENT
Start: 2022-07-25 | End: 2022-07-25

## 2022-07-25 RX ORDER — METHADONE HYDROCHLORIDE 40 MG/1
5 TABLET ORAL
Refills: 0 | Status: DISCONTINUED | OUTPATIENT
Start: 2022-07-25 | End: 2022-07-25

## 2022-07-25 RX ADMIN — Medication 20 MILLIGRAM(S): at 14:04

## 2022-07-25 RX ADMIN — Medication 20 MILLIGRAM(S): at 10:14

## 2022-07-25 RX ADMIN — METHADONE HYDROCHLORIDE 5 MILLIGRAM(S): 40 TABLET ORAL at 17:11

## 2022-07-25 RX ADMIN — Medication 4 MILLIGRAM(S): at 06:24

## 2022-07-25 RX ADMIN — Medication 20 MILLIGRAM(S): at 17:11

## 2022-07-25 RX ADMIN — Medication 4 MILLIGRAM(S): at 15:45

## 2022-07-25 RX ADMIN — Medication 20 MILLIGRAM(S): at 05:13

## 2022-07-25 RX ADMIN — Medication 100 MILLIGRAM(S): at 14:02

## 2022-07-25 RX ADMIN — Medication 20 MILLIGRAM(S): at 01:36

## 2022-07-25 RX ADMIN — METHADONE HYDROCHLORIDE 10 MILLIGRAM(S): 40 TABLET ORAL at 05:13

## 2022-07-25 NOTE — CONSULT NOTE ADULT - SUPERVISING ATTENDING
right knee pain since last friday, Seen by Ortho and was told it was arthritis but patient having worsening pain. Denies trauma/falling
Brendankin

## 2022-07-25 NOTE — CONSULT NOTE ADULT - PROBLEM SELECTOR RECOMMENDATION 9
After evaluation at this time with Pts intentions to return to all his prescriptions and his current stated history of use. Pt can be discharged back  to community [facility] and continued treatment or observed for 24hrs.   CAse discussed with Dr. Rivas.   CATCH team involved for aftercare and pt refusing aftercare at this time.

## 2022-07-25 NOTE — PROGRESS NOTE ADULT - ASSESSMENT
#toxic encephalopathy 2/2 Polysubstance abuse/ withdrawal (percoect) pt left AMA BEFORE and represent to ER for withdrawal sx  - on CIWa with Librium and methadone   - DC in AM  - F/u with Dr. Suárez pain management on Wednesday   - addiction med f/u appreciated     #transaminitis   - outpt eval with PCP    #DVT ppx  #dispo: anticipated for discharge in am   #toxic encephalopathy 2/2 Polysubstance abuse/ withdrawal (percoect) pt left AMA BEFORE and represent to ER for withdrawal sx  - on CIWa with Librium and methadone   - DC in AM  - F/u with Dr. Suárez pain management on Wednesday   - addiction med f/u appreciated     #drug abuse: pt admit taking percocet and xanax from street, pt sees Dr. Suárez for pain management and on percocet     #transaminitis   - outpt eval with PCP    #DVT ppx  #dispo: anticipated for discharge in am
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
## 2022-07-25 NOTE — PROGRESS NOTE ADULT - SUBJECTIVE AND OBJECTIVE BOX
Patient is a 42y old  Male who presents with a chief complaint of withdrawal (24 Jul 2022 11:36)      OVERNIGHT EVENTS: intermittently agitated     SUBJECTIVE / INTERVAL HPI: Patient seen and examined at bedside.     VITAL SIGNS:  Vital Signs Last 24 Hrs  T(C): 36 (25 Jul 2022 05:37), Max: 36.2 (24 Jul 2022 21:12)  T(F): 96.8 (25 Jul 2022 05:37), Max: 97.2 (24 Jul 2022 21:12)  HR: 101 (25 Jul 2022 05:37) (90 - 106)  BP: 146/86 (25 Jul 2022 05:37) (144/76 - 154/91)  BP(mean): --  RR: 18 (25 Jul 2022 05:37) (18 - 18)  SpO2: --        PHYSICAL EXAM:    General: WDWN  HEENT: NC/AT; PERRL, clear conjunctiva  Neck: supple  Cardiovascular: +S1/S2; RRR  Respiratory: CTA b/l; no W/R/R  Gastrointestinal: soft, NT/ND; +BSx4  Extremities: WWP; 2+ peripheral pulses; no edema   Neurological: AAOx3; no focal deficits    MEDICATIONS:  MEDICATIONS  (STANDING):  chlordiazePOXIDE   Oral   chlordiazePOXIDE 20 milliGRAM(s) Oral every 4 hours  lactated ringers. 1000 milliLiter(s) (100 mL/Hr) IV Continuous <Continuous>  methadone    Tablet 10 milliGRAM(s) Oral two times a day  thiamine 100 milliGRAM(s) Oral daily    MEDICATIONS  (PRN):  diazepam  Injectable 4 milliGRAM(s) IV Push every 6 hours PRN agitatiojn      ALLERGIES:  Allergies    No Known Allergies    Intolerances        LABS:                        13.9   14.54 )-----------( 312      ( 25 Jul 2022 11:20 )             43.0     07-25    136  |  100  |  17  ----------------------------<  97  4.3   |  27  |  1.4    Ca    9.3      25 Jul 2022 11:20  Mg     1.9     07-25    TPro  6.8  /  Alb  4.5  /  TBili  0.4  /  DBili  x   /  AST  29  /  ALT  38  /  AlkPhos  99  07-25

## 2022-07-26 LAB
CULTURE RESULTS: SIGNIFICANT CHANGE UP
CULTURE RESULTS: SIGNIFICANT CHANGE UP
SPECIMEN SOURCE: SIGNIFICANT CHANGE UP
SPECIMEN SOURCE: SIGNIFICANT CHANGE UP

## 2022-07-27 DIAGNOSIS — G92.9 UNSPECIFIED TOXIC ENCEPHALOPATHY: ICD-10-CM

## 2022-07-27 DIAGNOSIS — F14.23 COCAINE DEPENDENCE WITH WITHDRAWAL: ICD-10-CM

## 2022-07-27 DIAGNOSIS — F17.200 NICOTINE DEPENDENCE, UNSPECIFIED, UNCOMPLICATED: ICD-10-CM

## 2022-07-27 DIAGNOSIS — F11.13 OPIOID ABUSE WITH WITHDRAWAL: ICD-10-CM

## 2022-07-27 DIAGNOSIS — F13.139 SEDATIVE, HYPNOTIC OR ANXIOLYTIC ABUSE WITH WITHDRAWAL, UNSPECIFIED: ICD-10-CM

## 2022-07-27 DIAGNOSIS — E53.8 DEFICIENCY OF OTHER SPECIFIED B GROUP VITAMINS: ICD-10-CM

## 2022-07-27 DIAGNOSIS — E51.9 THIAMINE DEFICIENCY, UNSPECIFIED: ICD-10-CM

## 2022-07-27 DIAGNOSIS — R74.01 ELEVATION OF LEVELS OF LIVER TRANSAMINASE LEVELS: ICD-10-CM

## 2022-07-28 DIAGNOSIS — R74.01 ELEVATION OF LEVELS OF LIVER TRANSAMINASE LEVELS: ICD-10-CM

## 2022-07-28 DIAGNOSIS — F13.139 SEDATIVE, HYPNOTIC OR ANXIOLYTIC ABUSE WITH WITHDRAWAL, UNSPECIFIED: ICD-10-CM

## 2022-07-28 DIAGNOSIS — F41.9 ANXIETY DISORDER, UNSPECIFIED: ICD-10-CM

## 2022-07-28 DIAGNOSIS — F19.10 OTHER PSYCHOACTIVE SUBSTANCE ABUSE, UNCOMPLICATED: ICD-10-CM

## 2022-07-28 DIAGNOSIS — T40.2X5A ADVERSE EFFECT OF OTHER OPIOIDS, INITIAL ENCOUNTER: ICD-10-CM

## 2022-07-28 DIAGNOSIS — G92.8 OTHER TOXIC ENCEPHALOPATHY: ICD-10-CM

## 2022-07-28 DIAGNOSIS — R45.1 RESTLESSNESS AND AGITATION: ICD-10-CM

## 2022-07-29 LAB
AMPHET UR QL SCN: NEGATIVE NG/ML — SIGNIFICANT CHANGE UP
AMPHET UR QL SCN: NEGATIVE — SIGNIFICANT CHANGE UP
AMPHETAMINE IN-HOUSE INTERPRETATION: NEGATIVE — SIGNIFICANT CHANGE UP
AMPHETAMINE, UR RESULT: NEGATIVE NG/ML — SIGNIFICANT CHANGE UP
EDDP UR QL CFM: 257 NG/ML — SIGNIFICANT CHANGE UP
EDDP, UR RESULT: 257 NG/ML — SIGNIFICANT CHANGE UP
MDA UR QL SCN: NEGATIVE NG/ML — SIGNIFICANT CHANGE UP
MDA, UR RESULT: NEGATIVE NG/ML — SIGNIFICANT CHANGE UP
MDEA, UR RESULT: NEGATIVE NG/ML — SIGNIFICANT CHANGE UP
MDMA UR QL SCN: NEGATIVE NG/ML — SIGNIFICANT CHANGE UP
MDMA, UR RESULT: NEGATIVE NG/ML — SIGNIFICANT CHANGE UP
METHADONE IN-HOUSE INTERPRETATION: POSITIVE
METHADONE UR CFM-MCNC: POSITIVE
METHAMPHET UR QL SCN: NEGATIVE NG/ML — SIGNIFICANT CHANGE UP
METHAMPHETAMINE, UR RESULT: NEGATIVE NG/ML — SIGNIFICANT CHANGE UP

## 2022-09-26 PROBLEM — Z00.00 ENCOUNTER FOR PREVENTIVE HEALTH EXAMINATION: Status: ACTIVE | Noted: 2022-09-26

## 2022-09-27 ENCOUNTER — APPOINTMENT (OUTPATIENT)
Dept: ORTHOPEDIC SURGERY | Facility: CLINIC | Age: 42
End: 2022-09-27

## 2022-09-28 ENCOUNTER — APPOINTMENT (OUTPATIENT)
Dept: ORTHOPEDIC SURGERY | Facility: CLINIC | Age: 42
End: 2022-09-28

## 2022-10-04 ENCOUNTER — APPOINTMENT (OUTPATIENT)
Dept: MRI IMAGING | Facility: CLINIC | Age: 42
End: 2022-10-04

## 2022-10-06 ENCOUNTER — APPOINTMENT (OUTPATIENT)
Dept: MRI IMAGING | Facility: CLINIC | Age: 42
End: 2022-10-06

## 2022-10-06 PROCEDURE — 73221 MRI JOINT UPR EXTREM W/O DYE: CPT | Mod: LT

## 2023-04-07 NOTE — ED ADULT NURSE NOTE - PAIN RATING/NUMBER SCALE (0-10): REST
5 Zoryve Counseling:  I discussed with the patient that Zoryve is not for use in the eyes, mouth or vagina. The most commonly reported side effects include diarrhea, headache, insomnia, application site pain, upper respiratory tract infections, and urinary tract infections.  All of the patient's questions and concerns were addressed.

## 2023-07-09 ENCOUNTER — EMERGENCY (EMERGENCY)
Facility: HOSPITAL | Age: 43
LOS: 0 days | Discharge: HOME | End: 2023-07-09
Attending: EMERGENCY MEDICINE
Payer: MEDICARE

## 2023-07-09 VITALS
TEMPERATURE: 98 F | OXYGEN SATURATION: 99 % | HEART RATE: 73 BPM | SYSTOLIC BLOOD PRESSURE: 132 MMHG | RESPIRATION RATE: 18 BRPM | DIASTOLIC BLOOD PRESSURE: 76 MMHG

## 2023-07-09 VITALS
DIASTOLIC BLOOD PRESSURE: 81 MMHG | SYSTOLIC BLOOD PRESSURE: 143 MMHG | OXYGEN SATURATION: 99 % | RESPIRATION RATE: 18 BRPM | HEART RATE: 75 BPM | TEMPERATURE: 98 F | WEIGHT: 229.94 LBS

## 2023-07-09 DIAGNOSIS — R22.0 LOCALIZED SWELLING, MASS AND LUMP, HEAD: ICD-10-CM

## 2023-07-09 DIAGNOSIS — K02.9 DENTAL CARIES, UNSPECIFIED: ICD-10-CM

## 2023-07-09 DIAGNOSIS — K08.89 OTHER SPECIFIED DISORDERS OF TEETH AND SUPPORTING STRUCTURES: ICD-10-CM

## 2023-07-09 DIAGNOSIS — F41.9 ANXIETY DISORDER, UNSPECIFIED: ICD-10-CM

## 2023-07-09 PROCEDURE — 99283 EMERGENCY DEPT VISIT LOW MDM: CPT

## 2023-07-09 NOTE — ED PROVIDER NOTE - NSFOLLOWUPCLINICS_GEN_ALL_ED_FT
[facility]  Dental  [address] [place] [id number]  Phone: [phone]  Fax:   Follow Up Time: 4-6 Days

## 2023-07-09 NOTE — ED PROVIDER NOTE - CLINICAL SUMMARY MEDICAL DECISION MAKING FREE TEXT BOX
Per Dr. Dahlia Mathur:  Please let them know order was refilled . Wife notified. 43-year-old male presenting for evaluation of toothache.  Symptoms present for a long time, patient was supposed to get tooth extraction, but reported that his oral surgeon canceled the appointment.  He reports pain when eating, but denies any fever, difficulty swallowing, facial swelling or any other associated complaints.  Well-appearing male in no acute distress, there is no facial cellulitis, no palpable abscess, rather poor dentition, otherwise nml oropharynx.  Prescription antibiotic was sent to patient's pharmacy, he was advised to follow-up in dental clinic or his dentist as soon as possible, strict return precautions given.  Patient verbalized understanding is amenable with discharge plan.  Vital signs were reviewed.

## 2023-07-09 NOTE — ED PROVIDER NOTE - PHYSICAL EXAMINATION
VITAL SIGNS: I have reviewed nursing notes and confirm.  CONSTITUTIONAL: well-appearing, non-toxic, NAD  SKIN: Warm dry, normal skin turgor  HEAD: NCAT  EYES: EOMI, PERRLA, no scleral icterus  ENT: Moist mucous membranes, normal pharynx with no erythema or exudates, multiple dental caries, right upper gum erythematous and swollen, no drainage, right upper mouth swelling present  NECK: Supple; non tender. Full ROM. No cervical LAD  CARD: RRR, no murmurs, rubs or gallops  RESP: clear to ausculation b/l.  No rales, rhonchi, or wheezing.  ABD: soft, + BS, non-tender, non-distended, no rebound or guarding. No CVA tenderness  EXT: Full ROM, no bony tenderness, no pedal edema, no calf tenderness  NEURO: normal motor. normal sensory. CN II-XII intact. Cerebellar testing normal. Normal gait.  PSYCH: Cooperative, appropriate.

## 2023-07-09 NOTE — ED PROVIDER NOTE - OBJECTIVE STATEMENT
43-year-old male with past medical history of polysubstance abuse (heroin and benzos), anxiety who presents for right-sided dental pain.  Patient states that he was scheduled for oral surgery to remove his right upper molars and right lower molars, but states the surgery got postponed.  Endorses right-sided upper and lower dental pain for the past few days.  Patient is try to get in with his dentist, but has been unable to.  Endorses right-sided upper facial swelling.  No fevers, chills, nausea, vomiting, diarrhea, abdominal pain, cough, sore throat, drooling, neck swelling.

## 2023-07-09 NOTE — ED PROVIDER NOTE - NSFOLLOWUPINSTRUCTIONS_ED_ALL_ED_FT
Our Emergency Department Referral Coordinators will be reaching out to you in the next 24-48 hours from 9:00am to 5:00pm with a follow up appointment. Please expect a phone call from the hospital in that time frame. If you do not receive a call or if you have any questions or concerns, you can reach them at   [phone]    Dental Pain    Dental pain (toothache) may be caused by many things including tooth decay (cavities or caries), abscess or infection, injury, or the reason may be unknown. Your pain may only occur when you are chewing, are exposed to hot or cold temperature, are eating or drinking sugary foods or beverages, or your pain may be constant. If you were prescribed an antibiotic medicine, finish all of it even if you start to feel better. Rinsing your mouth with salt water or applying ice to the painful area of your face may help with the pain.    SEEK IMMEDIATE MEDICAL CARE IF YOU HAVE THE FOLLOWING SYMPTOMS: unable to open mouth, trouble breathing or swallowing, fever, or swelling of the face, neck or jaw.

## 2023-07-09 NOTE — ED PROVIDER NOTE - PATIENT PORTAL LINK FT
You can access the FollowMyHealth Patient Portal offered by [facility] by registering at the following website: [email or web address]. By joining [employer]’s FollowMyHealth portal, you will also be able to view your health information using other applications (apps) compatible with our system.

## 2023-07-09 NOTE — ED PROVIDER NOTE - ATTENDING CONTRIBUTION TO CARE
43-year-old male presenting for evaluation of toothache.  Symptoms present for a long time, patient was supposed to get tooth extraction, but reported that his oral surgeon canceled the appointment.  He reports pain when eating, but denies any fever, difficulty swallowing, facial swelling or any other associated complaints.  Well-appearing male in no acute distress, there is no facial cellulitis, no palpable abscess, rather poor dentition, otherwise nml oropharynx.  Prescription antibiotic was sent to patient's pharmacy, he was advised to follow-up in dental clinic or his dentist as soon as possible, strict return precautions given.  Patient verbalized understanding is amenable with discharge plan.  Vital signs were reviewed.

## 2023-07-09 NOTE — ED ADULT NURSE NOTE - OBJECTIVE STATEMENT
Jose,, is calling on behalf of pt to see if she could get a refill of some pain medication to last her through the weekend before she runs out.
Patient currently admitted.
Patient identified with two patient identifiers. Patient states she was seen in our office  yesterday and scheduled lap jai for 5/18/20. Dr. Ana Olivo prescribed Norco 5/325 mg patient states that was not helping with her pain so she went to ER last night and they gave her Toradol 10 mg tablets #9. Patient states the Toradol is helping her pain, she is requesting refill to get her threw the weekend until surgery on Monday. Informed patient I will need to discus with provider and return call on tomorrow. Patient in agreement.
c,o right side dental pain

## 2023-07-10 ENCOUNTER — OUTPATIENT (OUTPATIENT)
Dept: OUTPATIENT SERVICES | Facility: HOSPITAL | Age: 43
LOS: 1 days | End: 2023-07-10
Payer: SELF-PAY

## 2023-07-10 DIAGNOSIS — K02.9 DENTAL CARIES, UNSPECIFIED: ICD-10-CM

## 2023-07-10 PROCEDURE — D9110: CPT

## 2023-07-10 PROCEDURE — D0330: CPT

## 2023-07-31 NOTE — ED ADULT TRIAGE NOTE - NSWEIGHTCALCTOOLDRUG_GEN_A_CORE
Detail Level: Detailed General Sunscreen Counseling: I recommended a broad spectrum sunscreen with a SPF of 30 or higher.  I explained that SPF 30-50 sunscreens block approximately 97 percent of the sun's harmful rays.  Sunscreens should be applied at least 15 minutes prior to expected sun exposure and then every 2 hours after that as long as sun exposure continues. If swimming or exercising sunscreen should be reapplied every 45 minutes to an hour after getting wet or sweating.  One ounce, or the equivalent of a shot glass full of sunscreen, is adequate to protect the skin not covered by a bathing suit. I also recommended a lip balm with a sunscreen as well. Sun protective clothing can be used in lieu of sunscreen but must be worn the entire time you are exposed to the sun's rays.  used

## 2023-10-26 ENCOUNTER — INPATIENT (INPATIENT)
Facility: HOSPITAL | Age: 43
LOS: 5 days | Discharge: HOME | DRG: 917 | End: 2023-11-01
Attending: INTERNAL MEDICINE | Admitting: INTERNAL MEDICINE
Payer: MEDICARE

## 2023-10-26 VITALS — OXYGEN SATURATION: 100 % | HEART RATE: 103 BPM

## 2023-10-26 DIAGNOSIS — R06.00 DYSPNEA, UNSPECIFIED: ICD-10-CM

## 2023-10-26 LAB
ALBUMIN SERPL ELPH-MCNC: 4 G/DL — SIGNIFICANT CHANGE UP (ref 3.5–5.2)
ALBUMIN SERPL ELPH-MCNC: 4 G/DL — SIGNIFICANT CHANGE UP (ref 3.5–5.2)
ALP SERPL-CCNC: 81 U/L — SIGNIFICANT CHANGE UP (ref 30–115)
ALP SERPL-CCNC: 81 U/L — SIGNIFICANT CHANGE UP (ref 30–115)
ALT FLD-CCNC: 82 U/L — HIGH (ref 0–41)
ALT FLD-CCNC: 82 U/L — HIGH (ref 0–41)
ANION GAP SERPL CALC-SCNC: 9 MMOL/L — SIGNIFICANT CHANGE UP (ref 7–14)
ANION GAP SERPL CALC-SCNC: 9 MMOL/L — SIGNIFICANT CHANGE UP (ref 7–14)
AST SERPL-CCNC: 45 U/L — HIGH (ref 0–41)
AST SERPL-CCNC: 45 U/L — HIGH (ref 0–41)
BASE EXCESS BLDV CALC-SCNC: 2.3 MMOL/L — SIGNIFICANT CHANGE UP (ref -2–3)
BASE EXCESS BLDV CALC-SCNC: 2.3 MMOL/L — SIGNIFICANT CHANGE UP (ref -2–3)
BASOPHILS # BLD AUTO: 0.06 K/UL — SIGNIFICANT CHANGE UP (ref 0–0.2)
BASOPHILS # BLD AUTO: 0.06 K/UL — SIGNIFICANT CHANGE UP (ref 0–0.2)
BASOPHILS NFR BLD AUTO: 0.8 % — SIGNIFICANT CHANGE UP (ref 0–1)
BASOPHILS NFR BLD AUTO: 0.8 % — SIGNIFICANT CHANGE UP (ref 0–1)
BILIRUB SERPL-MCNC: 0.2 MG/DL — SIGNIFICANT CHANGE UP (ref 0.2–1.2)
BILIRUB SERPL-MCNC: 0.2 MG/DL — SIGNIFICANT CHANGE UP (ref 0.2–1.2)
BUN SERPL-MCNC: 13 MG/DL — SIGNIFICANT CHANGE UP (ref 10–20)
BUN SERPL-MCNC: 13 MG/DL — SIGNIFICANT CHANGE UP (ref 10–20)
CA-I SERPL-SCNC: 1.17 MMOL/L — SIGNIFICANT CHANGE UP (ref 1.15–1.33)
CA-I SERPL-SCNC: 1.17 MMOL/L — SIGNIFICANT CHANGE UP (ref 1.15–1.33)
CALCIUM SERPL-MCNC: 9.1 MG/DL — SIGNIFICANT CHANGE UP (ref 8.4–10.5)
CALCIUM SERPL-MCNC: 9.1 MG/DL — SIGNIFICANT CHANGE UP (ref 8.4–10.5)
CHLORIDE SERPL-SCNC: 102 MMOL/L — SIGNIFICANT CHANGE UP (ref 98–110)
CHLORIDE SERPL-SCNC: 102 MMOL/L — SIGNIFICANT CHANGE UP (ref 98–110)
CO2 SERPL-SCNC: 31 MMOL/L — SIGNIFICANT CHANGE UP (ref 17–32)
CO2 SERPL-SCNC: 31 MMOL/L — SIGNIFICANT CHANGE UP (ref 17–32)
CREAT SERPL-MCNC: 1.4 MG/DL — SIGNIFICANT CHANGE UP (ref 0.7–1.5)
CREAT SERPL-MCNC: 1.4 MG/DL — SIGNIFICANT CHANGE UP (ref 0.7–1.5)
EGFR: 65 ML/MIN/1.73M2 — SIGNIFICANT CHANGE UP
EGFR: 65 ML/MIN/1.73M2 — SIGNIFICANT CHANGE UP
EOSINOPHIL # BLD AUTO: 0.18 K/UL — SIGNIFICANT CHANGE UP (ref 0–0.7)
EOSINOPHIL # BLD AUTO: 0.18 K/UL — SIGNIFICANT CHANGE UP (ref 0–0.7)
EOSINOPHIL NFR BLD AUTO: 2.3 % — SIGNIFICANT CHANGE UP (ref 0–8)
EOSINOPHIL NFR BLD AUTO: 2.3 % — SIGNIFICANT CHANGE UP (ref 0–8)
GAS PNL BLDV: 136 MMOL/L — SIGNIFICANT CHANGE UP (ref 136–145)
GAS PNL BLDV: 136 MMOL/L — SIGNIFICANT CHANGE UP (ref 136–145)
GAS PNL BLDV: SIGNIFICANT CHANGE UP
GAS PNL BLDV: SIGNIFICANT CHANGE UP
GLUCOSE SERPL-MCNC: 136 MG/DL — HIGH (ref 70–99)
GLUCOSE SERPL-MCNC: 136 MG/DL — HIGH (ref 70–99)
HCO3 BLDV-SCNC: 32 MMOL/L — HIGH (ref 22–29)
HCO3 BLDV-SCNC: 32 MMOL/L — HIGH (ref 22–29)
HCT VFR BLD CALC: 41.4 % — LOW (ref 42–52)
HCT VFR BLD CALC: 41.4 % — LOW (ref 42–52)
HCT VFR BLDA CALC: 48 % — SIGNIFICANT CHANGE UP (ref 39–51)
HCT VFR BLDA CALC: 48 % — SIGNIFICANT CHANGE UP (ref 39–51)
HGB BLD CALC-MCNC: 16 G/DL — SIGNIFICANT CHANGE UP (ref 12.6–17.4)
HGB BLD CALC-MCNC: 16 G/DL — SIGNIFICANT CHANGE UP (ref 12.6–17.4)
HGB BLD-MCNC: 13 G/DL — LOW (ref 14–18)
HGB BLD-MCNC: 13 G/DL — LOW (ref 14–18)
IMM GRANULOCYTES NFR BLD AUTO: 0.9 % — HIGH (ref 0.1–0.3)
IMM GRANULOCYTES NFR BLD AUTO: 0.9 % — HIGH (ref 0.1–0.3)
LACTATE BLDV-MCNC: 2.1 MMOL/L — HIGH (ref 0.5–2)
LACTATE BLDV-MCNC: 2.1 MMOL/L — HIGH (ref 0.5–2)
LYMPHOCYTES # BLD AUTO: 2.51 K/UL — SIGNIFICANT CHANGE UP (ref 1.2–3.4)
LYMPHOCYTES # BLD AUTO: 2.51 K/UL — SIGNIFICANT CHANGE UP (ref 1.2–3.4)
LYMPHOCYTES # BLD AUTO: 31.7 % — SIGNIFICANT CHANGE UP (ref 20.5–51.1)
LYMPHOCYTES # BLD AUTO: 31.7 % — SIGNIFICANT CHANGE UP (ref 20.5–51.1)
MCHC RBC-ENTMCNC: 25.9 PG — LOW (ref 27–31)
MCHC RBC-ENTMCNC: 25.9 PG — LOW (ref 27–31)
MCHC RBC-ENTMCNC: 31.4 G/DL — LOW (ref 32–37)
MCHC RBC-ENTMCNC: 31.4 G/DL — LOW (ref 32–37)
MCV RBC AUTO: 82.6 FL — SIGNIFICANT CHANGE UP (ref 80–94)
MCV RBC AUTO: 82.6 FL — SIGNIFICANT CHANGE UP (ref 80–94)
MONOCYTES # BLD AUTO: 0.46 K/UL — SIGNIFICANT CHANGE UP (ref 0.1–0.6)
MONOCYTES # BLD AUTO: 0.46 K/UL — SIGNIFICANT CHANGE UP (ref 0.1–0.6)
MONOCYTES NFR BLD AUTO: 5.8 % — SIGNIFICANT CHANGE UP (ref 1.7–9.3)
MONOCYTES NFR BLD AUTO: 5.8 % — SIGNIFICANT CHANGE UP (ref 1.7–9.3)
NEUTROPHILS # BLD AUTO: 4.65 K/UL — SIGNIFICANT CHANGE UP (ref 1.4–6.5)
NEUTROPHILS # BLD AUTO: 4.65 K/UL — SIGNIFICANT CHANGE UP (ref 1.4–6.5)
NEUTROPHILS NFR BLD AUTO: 58.5 % — SIGNIFICANT CHANGE UP (ref 42.2–75.2)
NEUTROPHILS NFR BLD AUTO: 58.5 % — SIGNIFICANT CHANGE UP (ref 42.2–75.2)
NRBC # BLD: 0 /100 WBCS — SIGNIFICANT CHANGE UP (ref 0–0)
NRBC # BLD: 0 /100 WBCS — SIGNIFICANT CHANGE UP (ref 0–0)
NT-PROBNP SERPL-SCNC: <36 PG/ML — SIGNIFICANT CHANGE UP (ref 0–300)
NT-PROBNP SERPL-SCNC: <36 PG/ML — SIGNIFICANT CHANGE UP (ref 0–300)
PCO2 BLDV: 69 MMHG — HIGH (ref 42–55)
PCO2 BLDV: 69 MMHG — HIGH (ref 42–55)
PH BLDV: 7.27 — LOW (ref 7.32–7.43)
PH BLDV: 7.27 — LOW (ref 7.32–7.43)
PLATELET # BLD AUTO: 380 K/UL — SIGNIFICANT CHANGE UP (ref 130–400)
PLATELET # BLD AUTO: 380 K/UL — SIGNIFICANT CHANGE UP (ref 130–400)
PMV BLD: 8.5 FL — SIGNIFICANT CHANGE UP (ref 7.4–10.4)
PMV BLD: 8.5 FL — SIGNIFICANT CHANGE UP (ref 7.4–10.4)
PO2 BLDV: 29 MMHG — SIGNIFICANT CHANGE UP
PO2 BLDV: 29 MMHG — SIGNIFICANT CHANGE UP
POTASSIUM BLDV-SCNC: 4.3 MMOL/L — SIGNIFICANT CHANGE UP (ref 3.5–5.1)
POTASSIUM BLDV-SCNC: 4.3 MMOL/L — SIGNIFICANT CHANGE UP (ref 3.5–5.1)
POTASSIUM SERPL-MCNC: 4.5 MMOL/L — SIGNIFICANT CHANGE UP (ref 3.5–5)
POTASSIUM SERPL-MCNC: 4.5 MMOL/L — SIGNIFICANT CHANGE UP (ref 3.5–5)
POTASSIUM SERPL-SCNC: 4.5 MMOL/L — SIGNIFICANT CHANGE UP (ref 3.5–5)
POTASSIUM SERPL-SCNC: 4.5 MMOL/L — SIGNIFICANT CHANGE UP (ref 3.5–5)
PROT SERPL-MCNC: 6.8 G/DL — SIGNIFICANT CHANGE UP (ref 6–8)
PROT SERPL-MCNC: 6.8 G/DL — SIGNIFICANT CHANGE UP (ref 6–8)
RBC # BLD: 5.01 M/UL — SIGNIFICANT CHANGE UP (ref 4.7–6.1)
RBC # BLD: 5.01 M/UL — SIGNIFICANT CHANGE UP (ref 4.7–6.1)
RBC # FLD: 15.4 % — HIGH (ref 11.5–14.5)
RBC # FLD: 15.4 % — HIGH (ref 11.5–14.5)
SAO2 % BLDV: 36 % — SIGNIFICANT CHANGE UP
SAO2 % BLDV: 36 % — SIGNIFICANT CHANGE UP
SODIUM SERPL-SCNC: 142 MMOL/L — SIGNIFICANT CHANGE UP (ref 135–146)
SODIUM SERPL-SCNC: 142 MMOL/L — SIGNIFICANT CHANGE UP (ref 135–146)
TROPONIN T SERPL-MCNC: <0.01 NG/ML — SIGNIFICANT CHANGE UP
TROPONIN T SERPL-MCNC: <0.01 NG/ML — SIGNIFICANT CHANGE UP
WBC # BLD: 7.93 K/UL — SIGNIFICANT CHANGE UP (ref 4.8–10.8)
WBC # BLD: 7.93 K/UL — SIGNIFICANT CHANGE UP (ref 4.8–10.8)
WBC # FLD AUTO: 7.93 K/UL — SIGNIFICANT CHANGE UP (ref 4.8–10.8)
WBC # FLD AUTO: 7.93 K/UL — SIGNIFICANT CHANGE UP (ref 4.8–10.8)

## 2023-10-26 PROCEDURE — 93010 ELECTROCARDIOGRAM REPORT: CPT

## 2023-10-26 PROCEDURE — 85025 COMPLETE CBC W/AUTO DIFF WBC: CPT

## 2023-10-26 PROCEDURE — 85027 COMPLETE CBC AUTOMATED: CPT

## 2023-10-26 PROCEDURE — 87040 BLOOD CULTURE FOR BACTERIA: CPT

## 2023-10-26 PROCEDURE — 87070 CULTURE OTHR SPECIMN AEROBIC: CPT

## 2023-10-26 PROCEDURE — 80307 DRUG TEST PRSMV CHEM ANLYZR: CPT

## 2023-10-26 PROCEDURE — 99291 CRITICAL CARE FIRST HOUR: CPT | Mod: GC

## 2023-10-26 PROCEDURE — 87899 AGENT NOS ASSAY W/OPTIC: CPT

## 2023-10-26 PROCEDURE — 36415 COLL VENOUS BLD VENIPUNCTURE: CPT

## 2023-10-26 PROCEDURE — 80053 COMPREHEN METABOLIC PANEL: CPT

## 2023-10-26 PROCEDURE — 99221 1ST HOSP IP/OBS SF/LOW 40: CPT

## 2023-10-26 PROCEDURE — 87449 NOS EACH ORGANISM AG IA: CPT

## 2023-10-26 PROCEDURE — 83735 ASSAY OF MAGNESIUM: CPT

## 2023-10-26 PROCEDURE — 80048 BASIC METABOLIC PNL TOTAL CA: CPT

## 2023-10-26 PROCEDURE — 87635 SARS-COV-2 COVID-19 AMP PRB: CPT

## 2023-10-26 PROCEDURE — 71045 X-RAY EXAM CHEST 1 VIEW: CPT

## 2023-10-26 PROCEDURE — 80365 DRUG SCREENING OXYCODONE: CPT

## 2023-10-26 PROCEDURE — 94660 CPAP INITIATION&MGMT: CPT

## 2023-10-26 PROCEDURE — 80354 DRUG SCREENING FENTANYL: CPT

## 2023-10-26 PROCEDURE — 80326 AMPHETAMINES 5 OR MORE: CPT

## 2023-10-26 PROCEDURE — 71045 X-RAY EXAM CHEST 1 VIEW: CPT | Mod: 26

## 2023-10-26 RX ORDER — ONDANSETRON 8 MG/1
4 TABLET, FILM COATED ORAL EVERY 8 HOURS
Refills: 0 | Status: DISCONTINUED | OUTPATIENT
Start: 2023-10-26 | End: 2023-11-01

## 2023-10-26 RX ORDER — BUPROPION HYDROCHLORIDE 150 MG/1
1 TABLET, EXTENDED RELEASE ORAL
Refills: 0 | DISCHARGE

## 2023-10-26 RX ORDER — ACETAMINOPHEN 500 MG
650 TABLET ORAL EVERY 6 HOURS
Refills: 0 | Status: DISCONTINUED | OUTPATIENT
Start: 2023-10-26 | End: 2023-11-01

## 2023-10-26 RX ORDER — AMPICILLIN SODIUM AND SULBACTAM SODIUM 250; 125 MG/ML; MG/ML
3 INJECTION, POWDER, FOR SUSPENSION INTRAMUSCULAR; INTRAVENOUS EVERY 6 HOURS
Refills: 0 | Status: DISCONTINUED | OUTPATIENT
Start: 2023-10-26 | End: 2023-11-01

## 2023-10-26 RX ORDER — SODIUM CHLORIDE 9 MG/ML
1000 INJECTION INTRAMUSCULAR; INTRAVENOUS; SUBCUTANEOUS
Refills: 0 | Status: DISCONTINUED | OUTPATIENT
Start: 2023-10-26 | End: 2023-10-28

## 2023-10-26 RX ADMIN — AMPICILLIN SODIUM AND SULBACTAM SODIUM 200 GRAM(S): 250; 125 INJECTION, POWDER, FOR SUSPENSION INTRAMUSCULAR; INTRAVENOUS at 20:27

## 2023-10-26 NOTE — PATIENT PROFILE ADULT - TRANSPORTATION
IMPRESSION:  Acute hypo    PLAN:    CNS:    HEENT: Oral care    PULMONARY:  HOB @ 45 degrees.  Aspiration precautions     CARDIOVASCULAR:    GI: GI prophylaxis.  Feeding.  Bowel regimen     RENAL:  Follow up lytes.  Correct as needed    INFECTIOUS DISEASE: Follow up cultures    HEMATOLOGICAL:  DVT prophylaxis.    ENDOCRINE:  Follow up FS.  Insulin protocol if needed.    MUSCULOSKELETAL:         IMPRESSION:  Acute hypoxemic resp failure 2/2 covid pna  doubt superimposed PNA  HO ILD  HO RA on pred    PLAN:    CNS: avoid sedatives    HEENT: Oral care    PULMONARY:  HOB @ 45 degrees.  Aspiration precautions. Wean oxygen as tolerated, Goal Pox >92%. daily CXR    CARDIOVASCULAR: Keep I=O, goal Map >65    GI: GI prophylaxis.  Feeding.  Bowel regimen     RENAL:  Follow up lytes.  Correct as needed    INFECTIOUS DISEASE: Follow up cultures. Monitor procal. Dexamethaxone 6mg iv q24h, start remdesivir, plamsa 2 units today per ID    HEMATOLOGICAL:  DVT prophylaxis.     ENDOCRINE:  Follow up FS.  Insulin protocol if needed.    MUSCULOSKELETAL: OOBTC    Dispo: Stepdown Unit         no

## 2023-10-26 NOTE — ED PROVIDER NOTE - ATTENDING CONTRIBUTION TO CARE
I have personally performed a history and physical exam on this patient and personally directed the management of the patient. Patient is a 43-year-old male no past medical history brought in for evaluation of overdose history corroborated per EMS patient's girlfriend  Patient was found on the floor unresponsive she called 911 medics on scene and patient unresponsive and cyanotic with agonal breathing given Narcan per protocol became awake and alert and responsive patient had 1 episode of nonbilious nonbloody vomiting brought in for further evaluation upon arrival to the emergency department patient able to speak in full sentences no drooling complaining of worsening shortness of breath denies any chest pain abdominal pain back pain fevers chills vomiting patient was placed from CPAP prior to arrival to BiPAP improved denies any homicidal suicidal ideations denies any other coingestions at this time    On physical exam patient is normocephalic atraumatic pupils equal round reactive light accommodation pinpoint pupils noted oropharynx is clear chest reveals bilateral crackles at the bases with increased work of breathing but no respiratory distress abdomen soft nontender nondistended bowel sounds positive no guarding or extremities full range of motion radial pulses 2+ pedal pulses 2+ no edema      Assessment and plan patient presents for evaluation of overdose status post Narcan prehospital patient here is not in respiratory distress and speaking.  I do note bilateral crackles patient initially placed on BiPAP however improved he was trialed off BiPAP on nasal cannula maintaining saturations and respiratory status patient given IV antibiotics for possibility of aspiration considering his exam findings at this point patient is improved and no time was in respiratory distress speaking full sentences the entire time I will admit continue to monitor routine EKG  Per my independent evaluation is not consistent with STEMI arrhythmia or QT prolongation in addition we obtain x-ray per my evaluation not consistent with pneumothorax

## 2023-10-26 NOTE — ED PROVIDER NOTE - CLINICAL SUMMARY MEDICAL DECISION MAKING FREE TEXT BOX
plan patient presents for evaluation of overdose status post Narcan prehospital patient here is not in respiratory distress and speaking.  I do note bilateral crackles patient initially placed on BiPAP however improved he was trialed off BiPAP on nasal cannula maintaining saturations and respiratory status patient given IV antibiotics for possibility of aspiration considering his exam findings at this point patient is improved and no time was in respiratory distress speaking full sentences the entire time I will admit continue to monitor routine EKG  Per my independent evaluation is not consistent with STEMI arrhythmia or QT prolongation in addition we obtain x-ray per my evaluation not consistent with pneumothorax

## 2023-10-26 NOTE — ED PROVIDER NOTE - OBJECTIVE STATEMENT
Patient is a 43-year-old man without any known active medical history, brought in by EMS status post opioid overdose.  Per EMS, patient's girlfriend noted that the patient came in and laid himself on the floor.  She did notice that he became unresponsive and called 911.  On paramedics arrival, patient is unresponsive, cyanotic, with agonal breathing.  Patient given Narcan 4 mg intranasally after which he became awake alert and oriented x3.  Patient then vomited.  Was initially 75% on room air in the field.  Patient placed on CPAP at PEEP 10 by EMS.  On arrival, patient complaining of dyspnea, with crackles on exam; 88% on room air.  Patient placed on BiPAP 12/6, FiO2 of 60%.  Patient states he only took oral opioids, and no insufflation or IV drug use.

## 2023-10-26 NOTE — H&P ADULT - ASSESSMENT
# Dyspnea.   # Opioid overdose.   #  Nausea & vomiting. 43-year-old man with Hx opoid abuse, brought in by EMS status post opioid overdose.    # Dyspnea  # Opioid overdose.   - continue with O2 NC, off BPap and doing well  - UTox  - IVF  - monitor VS   - addiction medicine  - zofran PRN  - am labs

## 2023-10-26 NOTE — H&P ADULT - NSHPPHYSICALEXAM_GEN_ALL_CORE
February 2, 2019     Patient: Bairon Monte   YOB: 1998   Date of Visit: 2/2/2019       To Whom it May Concern:    Bairon Monte was seen in my clinic on 2/2/2019  She may return to school when has not had fever for 24 hours       If you have any questions or concerns, please don't hesitate to call           Sincerely,          PAWAN Blancas VITALS:   T(C): 36.7 (10-26-23 @ 21:50), Max: 36.7 (10-26-23 @ 21:50)  HR: 85 (10-26-23 @ 21:50) (85 - 103)  BP: 136/81 (10-26-23 @ 21:50) (135/92 - 147/81)  RR: 20 (10-26-23 @ 21:50) (20 - 24)  SpO2: 98% (10-26-23 @ 21:50) (98% - 100%)    GENERAL: NAD, lying in bed comfortably, pleasant, and cooperative   HEAD:  Atraumatic, Normocephalic  EYES: EOMI,  conjunctiva and sclera clear  ENT: Moist mucous membranes  NECK: Supple, No JVD  CHEST/LUNG: CTA b/l,  Unlabored respirations  HEART: No murmurs, rubs, or gallops  ABDOMEN: Bowel sounds present; Soft, Nontender, Nondistended.   EXTREMITIES: No clubbing, cyanosis, or edema  NERVOUS SYSTEM:  Alert & Oriented X3, speech clear. No deficits   MSK: FROM all 4 extremities, full and equal strength  SKIN: No rashes or lesions

## 2023-10-26 NOTE — H&P ADULT - NSHPLABSRESULTS_GEN_ALL_CORE
LABS:  cret                        13.0   7.93  )-----------( 380      ( 26 Oct 2023 20:05 )             41.4     10-26    142  |  102  |  13  ----------------------------<  136<H>  4.5   |  31  |  1.4    Ca    9.1      26 Oct 2023 20:05    TPro  6.8  /  Alb  4.0  /  TBili  0.2  /  DBili  x   /  AST  45<H>  /  ALT  82<H>  /  AlkPhos  81  10-26

## 2023-10-26 NOTE — ED PROVIDER NOTE - PHYSICAL EXAMINATION
_  Vital signs reviewed; ABCs intact  GENERAL: Well nourished, mildly uncomfortable on room air, better on BiPAP  SKIN: Warm, +moist  HEAD & NECK: NCAT, supple neck  EYES: EOMI, PER B/L  ENT: MMM  CARD: RRR, S1, S2; no murmurs, no rubs, no gallops  RESP: +Tachypneic; crackles diffusely   ABD: Soft, ND, NT, no rebound, no guarding  EXT: Pulses palpable distally  NEUROMSK: Grossly intact  PSYCH: AAOx3, cooperative, appropriate

## 2023-10-26 NOTE — PATIENT PROFILE ADULT - FALL HARM RISK - RISK INTERVENTIONS
Assistance OOB with selected safe patient handling equipment/Assistance with ambulation/Communicate Fall Risk and Risk Factors to all staff, patient, and family/Monitor for mental status changes/Monitor gait and stability/Reinforce activity limits and safety measures with patient and family/Toileting schedule using arm’s reach rule for commode and bathroom/Use of alarms - bed, chair and/or voice tab/Visual Cue: Yellow wristband/Bed in lowest position, wheels locked, appropriate side rails in place/Call bell, personal items and telephone in reach/Instruct patient to call for assistance before getting out of bed or chair/Non-slip footwear when patient is out of bed/[place] to call system/Physically safe environment - no spills, clutter or unnecessary equipment/Purposeful Proactive Rounding/Room/bathroom lighting operational, light cord in reach

## 2023-10-26 NOTE — ED ADULT NURSE NOTE - NSFALLUNIVINTERV_ED_ALL_ED
Bed/Stretcher in lowest position, wheels locked, appropriate side rails in place/Call bell, personal items and telephone in reach/Instruct patient to call for assistance before getting out of bed/chair/stretcher/Non-slip footwear applied when patient is off stretcher/[place] to call system/Physically safe environment - no spills, clutter or unnecessary equipment/Purposeful proactive rounding/Room/bathroom lighting operational, light cord in reach

## 2023-10-26 NOTE — H&P ADULT - NSHPSOCIALHISTORY_GEN_ALL_CORE
Admits to vaping  Admits to Hx of heroin and opioid abuse (oxy's)  Denies ETOH abuse  Lives with girlfriend

## 2023-10-26 NOTE — H&P ADULT - NS ATTEND AMEND GEN_ALL_CORE FT
Seen while in the ER Seen while in the ER, agree with above Seen while in the ER, agree with above (would characterize as acute respiratory failure with hypoxia due to opioid overdose

## 2023-10-26 NOTE — H&P ADULT - HISTORY OF PRESENT ILLNESS
43-year-old man without any known active medical history, brought in by EMS status post opioid overdose.  Per EMS, patient's girlfriend noted that the patient came in and laid himself on the floor.  She did notice that he became unresponsive and called 911.  On paramedics arrival, patient is unresponsive, cyanotic, with agonal breathing.  Patient given Narcan 4 mg intranasally after which he became awake alert and oriented x3.  Patient then vomited.  Was initially 75% on room air in the field.  Patient placed on CPAP at PEEP 10 by EMS.  On arrival, patient complaining of dyspnea, with crackles on exam; 88% on room air.  Patient placed on BiPAP 12/6, FiO2 of 60%.  Patient states he only took oral opioids, and no insufflation or IV drug use. 43-year-old man without any known active medical history, brought in by EMS status post opioid overdose.  Per EMS, patient's girlfriend noted that the patient came in and laid himself on the floor.  She did notice that he became unresponsive and called 911.  On paramedics arrival, patient is unresponsive, cyanotic, with agonal breathing.  Patient given Narcan 4 mg intranasally after which he became awake alert and oriented x3.  Patient then vomited.  Was initially 75% on room air in the field.  Patient placed on CPAP at PEEP 10 by EMS.  On arrival, patient complaining of dyspnea, with crackles on exam; 88% on room air.  Patient placed on BiPAP 12/6, FiO2 of 60%.  Patient admits he took pills, no  IV drug use.  Currently seen by medicine, pt doing better. Off Bipap and on 2L NC with no breathing complications. Pt very cooperative and pleasant. State he has Hx of OD in 2020 as well. Was prescribed oxys for chronic pain which led to heroin abuse. Currently states just taking oxycodone.  43-year-old man with Hx opoid abuse, brought in by EMS status post opioid overdose.  Per EMS, patient's girlfriend noted that the patient came in and laid himself on the floor.  She did notice that he became unresponsive and called 911.  On paramedics arrival, patient is unresponsive, cyanotic, with agonal breathing.  Patient given Narcan 4 mg intranasally after which he became awake alert and oriented x3.  Patient then vomited.  Was initially 75% on room air in the field.  Patient placed on CPAP at PEEP 10 by EMS.  On arrival, patient complaining of dyspnea, with crackles on exam; 88% on room air.  Patient placed on BiPAP 12/6, FiO2 of 60%.  Patient admits he took pills, no  IV drug use.  Currently seen by medicine, pt doing better. Off Bipap and on 2L NC with no breathing complications. Pt very cooperative and pleasant. State he has Hx of OD in 2020 as well. Was prescribed oxy's for chronic pain which led to heroin abuse. Currently states just taking oxycodone. Denies CP, SOB, abd pain, HA, dizziness, visual changes, SI/HI.

## 2023-10-26 NOTE — ED PROVIDER NOTE - PROGRESS NOTE DETAILS
Resident KOKO Hoover: Suspected Narcan-induced pulmonary edema vs. aspiration pneumonitis. Unasyn given. Endorsed to Hospitalist.

## 2023-10-26 NOTE — ED PROVIDER NOTE - CARE PLAN
1 Principal Discharge DX:	Dyspnea  Secondary Diagnosis:	Opioid overdose  Secondary Diagnosis:	Nausea & vomiting

## 2023-10-27 DIAGNOSIS — T40.601A POISONING BY UNSPECIFIED NARCOTICS, ACCIDENTAL (UNINTENTIONAL), INITIAL ENCOUNTER: ICD-10-CM

## 2023-10-27 LAB
ALBUMIN SERPL ELPH-MCNC: 3.6 G/DL — SIGNIFICANT CHANGE UP (ref 3.5–5.2)
ALBUMIN SERPL ELPH-MCNC: 3.6 G/DL — SIGNIFICANT CHANGE UP (ref 3.5–5.2)
ALP SERPL-CCNC: 60 U/L — SIGNIFICANT CHANGE UP (ref 30–115)
ALP SERPL-CCNC: 60 U/L — SIGNIFICANT CHANGE UP (ref 30–115)
ALT FLD-CCNC: 62 U/L — HIGH (ref 0–41)
ALT FLD-CCNC: 62 U/L — HIGH (ref 0–41)
ANION GAP SERPL CALC-SCNC: 10 MMOL/L — SIGNIFICANT CHANGE UP (ref 7–14)
ANION GAP SERPL CALC-SCNC: 10 MMOL/L — SIGNIFICANT CHANGE UP (ref 7–14)
AST SERPL-CCNC: 31 U/L — SIGNIFICANT CHANGE UP (ref 0–41)
AST SERPL-CCNC: 31 U/L — SIGNIFICANT CHANGE UP (ref 0–41)
BILIRUB SERPL-MCNC: 0.5 MG/DL — SIGNIFICANT CHANGE UP (ref 0.2–1.2)
BILIRUB SERPL-MCNC: 0.5 MG/DL — SIGNIFICANT CHANGE UP (ref 0.2–1.2)
BUN SERPL-MCNC: 10 MG/DL — SIGNIFICANT CHANGE UP (ref 10–20)
BUN SERPL-MCNC: 10 MG/DL — SIGNIFICANT CHANGE UP (ref 10–20)
CALCIUM SERPL-MCNC: 8.3 MG/DL — LOW (ref 8.4–10.5)
CALCIUM SERPL-MCNC: 8.3 MG/DL — LOW (ref 8.4–10.5)
CHLORIDE SERPL-SCNC: 98 MMOL/L — SIGNIFICANT CHANGE UP (ref 98–110)
CHLORIDE SERPL-SCNC: 98 MMOL/L — SIGNIFICANT CHANGE UP (ref 98–110)
CO2 SERPL-SCNC: 28 MMOL/L — SIGNIFICANT CHANGE UP (ref 17–32)
CO2 SERPL-SCNC: 28 MMOL/L — SIGNIFICANT CHANGE UP (ref 17–32)
CREAT SERPL-MCNC: 1.1 MG/DL — SIGNIFICANT CHANGE UP (ref 0.7–1.5)
CREAT SERPL-MCNC: 1.1 MG/DL — SIGNIFICANT CHANGE UP (ref 0.7–1.5)
DRUG SCREEN 1, URINE RESULT: SIGNIFICANT CHANGE UP
DRUG SCREEN 1, URINE RESULT: SIGNIFICANT CHANGE UP
EGFR: 86 ML/MIN/1.73M2 — SIGNIFICANT CHANGE UP
EGFR: 86 ML/MIN/1.73M2 — SIGNIFICANT CHANGE UP
GLUCOSE SERPL-MCNC: 89 MG/DL — SIGNIFICANT CHANGE UP (ref 70–99)
GLUCOSE SERPL-MCNC: 89 MG/DL — SIGNIFICANT CHANGE UP (ref 70–99)
HCT VFR BLD CALC: 36.3 % — LOW (ref 42–52)
HCT VFR BLD CALC: 36.3 % — LOW (ref 42–52)
HGB BLD-MCNC: 11.8 G/DL — LOW (ref 14–18)
HGB BLD-MCNC: 11.8 G/DL — LOW (ref 14–18)
MCHC RBC-ENTMCNC: 25.7 PG — LOW (ref 27–31)
MCHC RBC-ENTMCNC: 25.7 PG — LOW (ref 27–31)
MCHC RBC-ENTMCNC: 32.5 G/DL — SIGNIFICANT CHANGE UP (ref 32–37)
MCHC RBC-ENTMCNC: 32.5 G/DL — SIGNIFICANT CHANGE UP (ref 32–37)
MCV RBC AUTO: 79.1 FL — LOW (ref 80–94)
MCV RBC AUTO: 79.1 FL — LOW (ref 80–94)
NRBC # BLD: 0 /100 WBCS — SIGNIFICANT CHANGE UP (ref 0–0)
NRBC # BLD: 0 /100 WBCS — SIGNIFICANT CHANGE UP (ref 0–0)
PLATELET # BLD AUTO: 302 K/UL — SIGNIFICANT CHANGE UP (ref 130–400)
PLATELET # BLD AUTO: 302 K/UL — SIGNIFICANT CHANGE UP (ref 130–400)
PMV BLD: 9 FL — SIGNIFICANT CHANGE UP (ref 7.4–10.4)
PMV BLD: 9 FL — SIGNIFICANT CHANGE UP (ref 7.4–10.4)
POTASSIUM SERPL-MCNC: 4.3 MMOL/L — SIGNIFICANT CHANGE UP (ref 3.5–5)
POTASSIUM SERPL-MCNC: 4.3 MMOL/L — SIGNIFICANT CHANGE UP (ref 3.5–5)
POTASSIUM SERPL-SCNC: 4.3 MMOL/L — SIGNIFICANT CHANGE UP (ref 3.5–5)
POTASSIUM SERPL-SCNC: 4.3 MMOL/L — SIGNIFICANT CHANGE UP (ref 3.5–5)
PROT SERPL-MCNC: 5.8 G/DL — LOW (ref 6–8)
PROT SERPL-MCNC: 5.8 G/DL — LOW (ref 6–8)
RBC # BLD: 4.59 M/UL — LOW (ref 4.7–6.1)
RBC # BLD: 4.59 M/UL — LOW (ref 4.7–6.1)
RBC # FLD: 15.6 % — HIGH (ref 11.5–14.5)
RBC # FLD: 15.6 % — HIGH (ref 11.5–14.5)
SODIUM SERPL-SCNC: 136 MMOL/L — SIGNIFICANT CHANGE UP (ref 135–146)
SODIUM SERPL-SCNC: 136 MMOL/L — SIGNIFICANT CHANGE UP (ref 135–146)
WBC # BLD: 19.33 K/UL — HIGH (ref 4.8–10.8)
WBC # BLD: 19.33 K/UL — HIGH (ref 4.8–10.8)
WBC # FLD AUTO: 19.33 K/UL — HIGH (ref 4.8–10.8)
WBC # FLD AUTO: 19.33 K/UL — HIGH (ref 4.8–10.8)

## 2023-10-27 PROCEDURE — 99221 1ST HOSP IP/OBS SF/LOW 40: CPT

## 2023-10-27 PROCEDURE — 99232 SBSQ HOSP IP/OBS MODERATE 35: CPT

## 2023-10-27 RX ORDER — PANTOPRAZOLE SODIUM 20 MG/1
40 TABLET, DELAYED RELEASE ORAL
Refills: 0 | Status: DISCONTINUED | OUTPATIENT
Start: 2023-10-27 | End: 2023-11-01

## 2023-10-27 RX ADMIN — AMPICILLIN SODIUM AND SULBACTAM SODIUM 200 GRAM(S): 250; 125 INJECTION, POWDER, FOR SUSPENSION INTRAMUSCULAR; INTRAVENOUS at 17:35

## 2023-10-27 RX ADMIN — SODIUM CHLORIDE 80 MILLILITER(S): 9 INJECTION INTRAMUSCULAR; INTRAVENOUS; SUBCUTANEOUS at 12:03

## 2023-10-27 RX ADMIN — AMPICILLIN SODIUM AND SULBACTAM SODIUM 200 GRAM(S): 250; 125 INJECTION, POWDER, FOR SUSPENSION INTRAMUSCULAR; INTRAVENOUS at 12:03

## 2023-10-27 RX ADMIN — AMPICILLIN SODIUM AND SULBACTAM SODIUM 200 GRAM(S): 250; 125 INJECTION, POWDER, FOR SUSPENSION INTRAMUSCULAR; INTRAVENOUS at 04:53

## 2023-10-27 NOTE — PROGRESS NOTE ADULT - SUBJECTIVE AND OBJECTIVE BOX
SUBJECTIVE:    Patient is a 43y old Male who presents with a chief complaint of   Currently admitted to medicine with the primary diagnosis of Dyspnea    Interval history:      Overnight events noted. Patient lethargic but easily arousable this morning. coughing up yellowish phlegm. As per RN: patient had a bottle of xanax pills on his sheet this morning.  Patient denies taking any xanax this morning. he reports taking 4 xanax pills and some opioids pills (not sure about name) last night around 8 PM. Patient also vapes daily.  He desaturated on room air today and was placed on venti mask.    Admit Diagnosis:  Dyspnea        PAST MEDICAL & SURGICAL HISTORY  Polysubstance abuse    No significant past surgical history    No pertinent past medical history    Polysubstance abuse    No significant past surgical history        SOCIAL HISTORY:  Negative for smoking/alcohol/drug use.     ALLERGIES:  No Known Allergies      PHYSICAL EXAM:  GEN: Lethargic, No acute distress  HEAD: atraumatic, normocephalic  EYES: pupils approx. 2 mm in diameter, sluggish  ENT: moist mucus membranes, stain of yellowish phlegm around mouth  LUNGS: Clear to auscultation bilaterally   HEART: S1/S2  ABD: Soft, NT/ND. BS +  EXT: no cyanosis/edema  NEURO: AAOX3    Intravenous access:   NG tube:   Callaway Catheter:     VITALS:   Vital Signs Last 24 Hrs  T(C): 36.1 (27 Oct 2023 14:03), Max: 36.7 (26 Oct 2023 21:50)  T(F): 97 (27 Oct 2023 14:03), Max: 98 (26 Oct 2023 21:50)  HR: 96 (27 Oct 2023 14:03) (85 - 103)  BP: 142/77 (27 Oct 2023 14:03) (125/67 - 147/81)  RR: 18 (27 Oct 2023 14:03) (16 - 24)  SpO2: 97% (27 Oct 2023 08:26) (97% - 100%)    Parameters below as of 27 Oct 2023 08:26  Patient On (Oxygen Delivery Method): mask, Venturi    O2 Concentration (%): 50      I&Os:    MEDICATIONS:  STANDING:  ampicillin/sulbactam  IVPB 3 Gram(s) IV Intermittent every 6 hours, 10-26-23 @ 20:03  sodium chloride 0.9%. 1000 milliLiter(s) (80 mL/Hr) IV Continuous <Continuous>, 10-26-23 @ 23:15      PRN:  acetaminophen     Tablet .. 650 milliGRAM(s) Oral every 6 hours, 10-26-23 @ 23:15 PRN  aluminum hydroxide/magnesium hydroxide/simethicone Suspension 30 milliLiter(s) Oral every 4 hours, 10-26-23 @ 23:15 PRN  ondansetron Injectable 4 milliGRAM(s) IV Push every 8 hours, 10-26-23 @ 23:15 PRN    Diet, Regular (10-26-23 @ 23:17) [Active]    LABS:                                            11.8                  Neurophils% (auto):   x      (10-27 @ 06:39):    19.33)-----------(302          Lymphocytes% (auto):  x                                             36.3                   Eosinphils% (auto):   x        Manual%: Neutrophils x    ; Lymphocytes x    ; Eosinophils x    ; Bands%: x    ; Blasts x          WBC trend: 19.33 <--, 7.93 <--  Hgb: 11.8 [10-27-23 @ 06:39]<--, 13.0 [10-26-23 @ 20:05]<--                            136    |  98     |  10                  Calcium: 8.3   / iCa: x      (10-27 @ 06:39)    ----------------------------<  89        Magnesium: x                                4.3     |  28     |  1.1              Phosphorous: x        TPro  5.8    /  Alb  3.6    /  TBili  0.5    /  DBili  x      /  AST  31     /  ALT  62     /  AlkPhos  60     27 Oct 2023 06:39    Creatinine trend: 1.1<--, 1.4<--  SODIUM TREND: Sodium 136 [10-27 @ 06:39]<--, Sodium 142 [10-26 @ 20:05]<--    POC Glucose:     VBG - ( 26 Oct 2023 20:07 )  pH: 7.27  /  pCO2: 69    /  pO2: 29    / HCO3: 32    / Base Excess: 2.10  /  SaO2: 36.0  /  Lactate: 2.10       CARDIAC MARKERS ( 26 Oct 2023 20:05 )  x     / <0.01 ng/mL / x     / x     / x            Urinalysis Basic - ( 27 Oct 2023 06:39 )    Color: x / Appearance: x / SG: x / pH: x  Gluc: 89 mg/dL / Ketone: x  / Bili: x / Urobili: x   Blood: x / Protein: x / Nitrite: x   Leuk Esterase: x / RBC: x / WBC x   Sq Epi: x / Non Sq Epi: x / Bacteria: x      RADIOLOGY:  < from: Xray Chest 1 View AP/PA (10.26.23 @ 20:39) >  Questionable right basilar opacity versus confluence of shadows.    < end of copied text >

## 2023-10-27 NOTE — CONSULT NOTE ADULT - SUBJECTIVE AND OBJECTIVE BOX
From ER:    43-year-old man with Hx opoid abuse, brought in by EMS status post opioid overdose.  Per EMS, patient's girlfriend noted that the patient came in and laid himself on the floor.  She did notice that he became unresponsive and called 911.  On paramedics arrival, patient is unresponsive, cyanotic, with agonal breathing.  Patient given Narcan 4 mg intranasally after which he became awake alert and oriented x3.  Patient then vomited.  Was initially 75% on room air in the field.  Patient placed on CPAP at PEEP 10 by EMS.  On arrival, patient complaining of dyspnea, with crackles on exam; 88% on room air.  Patient placed on BiPAP 12/6, FiO2 of 60%.  Patient admits he took pills, no  IV drug use.  Currently seen by medicine, pt doing better. Off Bipap and on 2L NC with no breathing complications. Pt very cooperative and pleasant. State he has Hx of OD in 2020 as well. Was prescribed oxy's for chronic pain which led to heroin abuse. Currently states just taking oxycodone. Denies CP, SOB, abd pain, HA, dizziness, visual changes, SI/HI.       Pt interviewed, examined and EMR chart reviewed.  Pt still sedated. Unable to obtain clear history.  Pt states he was clean for 8 months prior to 4 day binge.   Pt states 10-12 perc 10s.  variable periods of sobriety in the past.  Has been in detox before __X___yes,   _____No    SOCIAL HISTORY:    REVIEW OF SYSTEMS:    Constitutional: No fever, weight loss or fatigue  ENT:  No difficulty hearing, tinnitus, vertigo; No sinus or throat pain  Neck: No pain or stiffness  Respiratory SEE HPI  Cardiovascular: No chest pain, palpitations, shortness of breath, dizziness or leg swelling  Gastrointestinal: No abdominal or epigastric pain. No nausea, vomiting or hematemesis; No diarrhea or constipation. No melena or hematochezia.  Neurological: No headaches, memory loss, loss of strength, numbness or tremors  Musculoskeletal: No joint pain or swelling; No muscle, back or extremity pain  Psychiatric: No depression, anxiety, mood swings or difficulty sleeping    MEDICATIONS  (STANDING):  ampicillin/sulbactam  IVPB 3 Gram(s) IV Intermittent every 6 hours  sodium chloride 0.9%. 1000 milliLiter(s) (80 mL/Hr) IV Continuous <Continuous>    MEDICATIONS  (PRN):  acetaminophen     Tablet .. 650 milliGRAM(s) Oral every 6 hours PRN Temp greater or equal to 38C (100.4F), Mild Pain (1 - 3)  aluminum hydroxide/magnesium hydroxide/simethicone Suspension 30 milliLiter(s) Oral every 4 hours PRN Dyspepsia  ondansetron Injectable 4 milliGRAM(s) IV Push every 8 hours PRN Nausea and/or Vomiting      Vital Signs Last 24 Hrs  T(C): 35.7 (27 Oct 2023 05:00), Max: 36.7 (26 Oct 2023 21:50)  T(F): 96.2 (27 Oct 2023 05:00), Max: 98 (26 Oct 2023 21:50)  HR: 91 (27 Oct 2023 05:00) (85 - 103)  BP: 125/67 (27 Oct 2023 05:00) (125/67 - 147/81)  BP(mean): --  RR: 16 (27 Oct 2023 08:26) (16 - 24)  SpO2: 97% (27 Oct 2023 08:26) (97% - 100%)    Parameters below as of 27 Oct 2023 08:26  Patient On (Oxygen Delivery Method): mask, Venturi    O2 Concentration (%): 50    PHYSICAL EXAM:    Constitutional: NAD, well-groomed, well-developed  HEENT: PERRLA, EOMI, Normal Hearing,  Neck: No LAD, No JVD  Back: Normal spine flexure, No CVA tenderness  Respiratory: CTAB/L  Cardiovascular: S1 and S2, RRR, no M/G/R  Gastrointestinal: BS+, soft, NT/ND  Extremities: No peripheral edema  Neurological: A/O x 3, no focal deficits    LABS:                        13.0   7.93  )-----------( 380      ( 26 Oct 2023 20:05 )             41.4     10-26    142  |  102  |  13  ----------------------------<  136<H>  4.5   |  31  |  1.4    Ca    9.1      26 Oct 2023 20:05    TPro  6.8  /  Alb  4.0  /  TBili  0.2  /  DBili  x   /  AST  45<H>  /  ALT  82<H>  /  AlkPhos  81  10-26      Urinalysis Basic - ( 26 Oct 2023 20:05 )    Color: x / Appearance: x / SG: x / pH: x  Gluc: 136 mg/dL / Ketone: x  / Bili: x / Urobili: x   Blood: x / Protein: x / Nitrite: x   Leuk Esterase: x / RBC: x / WBC x   Sq Epi: x / Non Sq Epi: x / Bacteria: x      Drug Screen Urine:  Alcohol Level        RADIOLOGY & ADDITIONAL STUDIES:

## 2023-10-27 NOTE — CONSULT NOTE ADULT - PROBLEM SELECTOR RECOMMENDATION 9
After evaluation at this time supportive care, no need for opiate replacement. Pt to be evaluated for breakthrough withdrawal in next 24h-48 hours. Although unlikely if history is accurate.  Obtain UDS if not done already.  Use of Vistaril for anxiety.  Consider adding gabapentin for anxiety standing order and follow up with PMD/Program for continuation of treatment.     counseling provided   CATCH team involved for aftercare and pt will follow up with aftercare when patient is more stable

## 2023-10-27 NOTE — PROGRESS NOTE ADULT - ASSESSMENT
43-year-old man with Hx polysubstance abuse, brought in by EMS status post opioid overdose. s/p narcan.    # Opioid overdose / polysubstance abuse  - urine tox pending  - c/w IVF  - monitor VS   - addiction medicine eval appreciated.  - zofran PRN      # Dyspnea  # concern for aspiration pna  - on venti mask  - wean off O2 as tolerated. Target SaO2 > 94%  - c/w Unasyn  - follow cultures, urine legionella, urine strep  - trend fever and WBC curve  - repeat CXR in AM      DVT: n/a  GI: pantoprazole  Diet: Regular  Activity: Increase as tolerated  Dispo: Acute for now

## 2023-10-28 LAB
ALBUMIN SERPL ELPH-MCNC: 3.4 G/DL — LOW (ref 3.5–5.2)
ALBUMIN SERPL ELPH-MCNC: 3.4 G/DL — LOW (ref 3.5–5.2)
ALP SERPL-CCNC: 63 U/L — SIGNIFICANT CHANGE UP (ref 30–115)
ALP SERPL-CCNC: 63 U/L — SIGNIFICANT CHANGE UP (ref 30–115)
ALT FLD-CCNC: 45 U/L — HIGH (ref 0–41)
ALT FLD-CCNC: 45 U/L — HIGH (ref 0–41)
ANION GAP SERPL CALC-SCNC: 9 MMOL/L — SIGNIFICANT CHANGE UP (ref 7–14)
ANION GAP SERPL CALC-SCNC: 9 MMOL/L — SIGNIFICANT CHANGE UP (ref 7–14)
AST SERPL-CCNC: 20 U/L — SIGNIFICANT CHANGE UP (ref 0–41)
AST SERPL-CCNC: 20 U/L — SIGNIFICANT CHANGE UP (ref 0–41)
BASOPHILS # BLD AUTO: 0.03 K/UL — SIGNIFICANT CHANGE UP (ref 0–0.2)
BASOPHILS # BLD AUTO: 0.03 K/UL — SIGNIFICANT CHANGE UP (ref 0–0.2)
BASOPHILS NFR BLD AUTO: 0.3 % — SIGNIFICANT CHANGE UP (ref 0–1)
BASOPHILS NFR BLD AUTO: 0.3 % — SIGNIFICANT CHANGE UP (ref 0–1)
BILIRUB SERPL-MCNC: 0.4 MG/DL — SIGNIFICANT CHANGE UP (ref 0.2–1.2)
BILIRUB SERPL-MCNC: 0.4 MG/DL — SIGNIFICANT CHANGE UP (ref 0.2–1.2)
BUN SERPL-MCNC: 10 MG/DL — SIGNIFICANT CHANGE UP (ref 10–20)
BUN SERPL-MCNC: 10 MG/DL — SIGNIFICANT CHANGE UP (ref 10–20)
CALCIUM SERPL-MCNC: 8.5 MG/DL — SIGNIFICANT CHANGE UP (ref 8.4–10.5)
CALCIUM SERPL-MCNC: 8.5 MG/DL — SIGNIFICANT CHANGE UP (ref 8.4–10.5)
CHLORIDE SERPL-SCNC: 100 MMOL/L — SIGNIFICANT CHANGE UP (ref 98–110)
CHLORIDE SERPL-SCNC: 100 MMOL/L — SIGNIFICANT CHANGE UP (ref 98–110)
CO2 SERPL-SCNC: 29 MMOL/L — SIGNIFICANT CHANGE UP (ref 17–32)
CO2 SERPL-SCNC: 29 MMOL/L — SIGNIFICANT CHANGE UP (ref 17–32)
CREAT SERPL-MCNC: 1 MG/DL — SIGNIFICANT CHANGE UP (ref 0.7–1.5)
CREAT SERPL-MCNC: 1 MG/DL — SIGNIFICANT CHANGE UP (ref 0.7–1.5)
EGFR: 97 ML/MIN/1.73M2 — SIGNIFICANT CHANGE UP
EGFR: 97 ML/MIN/1.73M2 — SIGNIFICANT CHANGE UP
EOSINOPHIL # BLD AUTO: 0.18 K/UL — SIGNIFICANT CHANGE UP (ref 0–0.7)
EOSINOPHIL # BLD AUTO: 0.18 K/UL — SIGNIFICANT CHANGE UP (ref 0–0.7)
EOSINOPHIL NFR BLD AUTO: 1.9 % — SIGNIFICANT CHANGE UP (ref 0–8)
EOSINOPHIL NFR BLD AUTO: 1.9 % — SIGNIFICANT CHANGE UP (ref 0–8)
FENTANYL UR QL: POSITIVE
FENTANYL UR QL: POSITIVE
GLUCOSE SERPL-MCNC: 90 MG/DL — SIGNIFICANT CHANGE UP (ref 70–99)
GLUCOSE SERPL-MCNC: 90 MG/DL — SIGNIFICANT CHANGE UP (ref 70–99)
HCT VFR BLD CALC: 36 % — LOW (ref 42–52)
HCT VFR BLD CALC: 36 % — LOW (ref 42–52)
HGB BLD-MCNC: 11.4 G/DL — LOW (ref 14–18)
HGB BLD-MCNC: 11.4 G/DL — LOW (ref 14–18)
IMM GRANULOCYTES NFR BLD AUTO: 0.4 % — HIGH (ref 0.1–0.3)
IMM GRANULOCYTES NFR BLD AUTO: 0.4 % — HIGH (ref 0.1–0.3)
LYMPHOCYTES # BLD AUTO: 1.26 K/UL — SIGNIFICANT CHANGE UP (ref 1.2–3.4)
LYMPHOCYTES # BLD AUTO: 1.26 K/UL — SIGNIFICANT CHANGE UP (ref 1.2–3.4)
LYMPHOCYTES # BLD AUTO: 13.4 % — LOW (ref 20.5–51.1)
LYMPHOCYTES # BLD AUTO: 13.4 % — LOW (ref 20.5–51.1)
MAGNESIUM SERPL-MCNC: 1.9 MG/DL — SIGNIFICANT CHANGE UP (ref 1.8–2.4)
MAGNESIUM SERPL-MCNC: 1.9 MG/DL — SIGNIFICANT CHANGE UP (ref 1.8–2.4)
MCHC RBC-ENTMCNC: 25.5 PG — LOW (ref 27–31)
MCHC RBC-ENTMCNC: 25.5 PG — LOW (ref 27–31)
MCHC RBC-ENTMCNC: 31.7 G/DL — LOW (ref 32–37)
MCHC RBC-ENTMCNC: 31.7 G/DL — LOW (ref 32–37)
MCV RBC AUTO: 80.5 FL — SIGNIFICANT CHANGE UP (ref 80–94)
MCV RBC AUTO: 80.5 FL — SIGNIFICANT CHANGE UP (ref 80–94)
MONOCYTES # BLD AUTO: 0.6 K/UL — SIGNIFICANT CHANGE UP (ref 0.1–0.6)
MONOCYTES # BLD AUTO: 0.6 K/UL — SIGNIFICANT CHANGE UP (ref 0.1–0.6)
MONOCYTES NFR BLD AUTO: 6.4 % — SIGNIFICANT CHANGE UP (ref 1.7–9.3)
MONOCYTES NFR BLD AUTO: 6.4 % — SIGNIFICANT CHANGE UP (ref 1.7–9.3)
NEUTROPHILS # BLD AUTO: 7.32 K/UL — HIGH (ref 1.4–6.5)
NEUTROPHILS # BLD AUTO: 7.32 K/UL — HIGH (ref 1.4–6.5)
NEUTROPHILS NFR BLD AUTO: 77.6 % — HIGH (ref 42.2–75.2)
NEUTROPHILS NFR BLD AUTO: 77.6 % — HIGH (ref 42.2–75.2)
NRBC # BLD: 0 /100 WBCS — SIGNIFICANT CHANGE UP (ref 0–0)
NRBC # BLD: 0 /100 WBCS — SIGNIFICANT CHANGE UP (ref 0–0)
PLATELET # BLD AUTO: 286 K/UL — SIGNIFICANT CHANGE UP (ref 130–400)
PLATELET # BLD AUTO: 286 K/UL — SIGNIFICANT CHANGE UP (ref 130–400)
PMV BLD: 9 FL — SIGNIFICANT CHANGE UP (ref 7.4–10.4)
PMV BLD: 9 FL — SIGNIFICANT CHANGE UP (ref 7.4–10.4)
POTASSIUM SERPL-MCNC: 4.5 MMOL/L — SIGNIFICANT CHANGE UP (ref 3.5–5)
POTASSIUM SERPL-MCNC: 4.5 MMOL/L — SIGNIFICANT CHANGE UP (ref 3.5–5)
POTASSIUM SERPL-SCNC: 4.5 MMOL/L — SIGNIFICANT CHANGE UP (ref 3.5–5)
POTASSIUM SERPL-SCNC: 4.5 MMOL/L — SIGNIFICANT CHANGE UP (ref 3.5–5)
PROT SERPL-MCNC: 5.9 G/DL — LOW (ref 6–8)
PROT SERPL-MCNC: 5.9 G/DL — LOW (ref 6–8)
RBC # BLD: 4.47 M/UL — LOW (ref 4.7–6.1)
RBC # BLD: 4.47 M/UL — LOW (ref 4.7–6.1)
RBC # FLD: 15.5 % — HIGH (ref 11.5–14.5)
RBC # FLD: 15.5 % — HIGH (ref 11.5–14.5)
SODIUM SERPL-SCNC: 138 MMOL/L — SIGNIFICANT CHANGE UP (ref 135–146)
SODIUM SERPL-SCNC: 138 MMOL/L — SIGNIFICANT CHANGE UP (ref 135–146)
WBC # BLD: 9.43 K/UL — SIGNIFICANT CHANGE UP (ref 4.8–10.8)
WBC # BLD: 9.43 K/UL — SIGNIFICANT CHANGE UP (ref 4.8–10.8)
WBC # FLD AUTO: 9.43 K/UL — SIGNIFICANT CHANGE UP (ref 4.8–10.8)
WBC # FLD AUTO: 9.43 K/UL — SIGNIFICANT CHANGE UP (ref 4.8–10.8)

## 2023-10-28 PROCEDURE — 71045 X-RAY EXAM CHEST 1 VIEW: CPT | Mod: 26,77

## 2023-10-28 PROCEDURE — 99232 SBSQ HOSP IP/OBS MODERATE 35: CPT

## 2023-10-28 PROCEDURE — 71045 X-RAY EXAM CHEST 1 VIEW: CPT | Mod: 26

## 2023-10-28 RX ADMIN — AMPICILLIN SODIUM AND SULBACTAM SODIUM 200 GRAM(S): 250; 125 INJECTION, POWDER, FOR SUSPENSION INTRAMUSCULAR; INTRAVENOUS at 17:06

## 2023-10-28 RX ADMIN — SODIUM CHLORIDE 80 MILLILITER(S): 9 INJECTION INTRAMUSCULAR; INTRAVENOUS; SUBCUTANEOUS at 10:27

## 2023-10-28 RX ADMIN — AMPICILLIN SODIUM AND SULBACTAM SODIUM 200 GRAM(S): 250; 125 INJECTION, POWDER, FOR SUSPENSION INTRAMUSCULAR; INTRAVENOUS at 01:42

## 2023-10-28 RX ADMIN — PANTOPRAZOLE SODIUM 40 MILLIGRAM(S): 20 TABLET, DELAYED RELEASE ORAL at 05:27

## 2023-10-28 RX ADMIN — AMPICILLIN SODIUM AND SULBACTAM SODIUM 200 GRAM(S): 250; 125 INJECTION, POWDER, FOR SUSPENSION INTRAMUSCULAR; INTRAVENOUS at 11:52

## 2023-10-28 RX ADMIN — AMPICILLIN SODIUM AND SULBACTAM SODIUM 200 GRAM(S): 250; 125 INJECTION, POWDER, FOR SUSPENSION INTRAMUSCULAR; INTRAVENOUS at 05:24

## 2023-10-28 NOTE — PROGRESS NOTE ADULT - SUBJECTIVE AND OBJECTIVE BOX
SUBJECTIVE:    Patient is a 43y old Male who presents with a chief complaint of   Currently admitted to medicine with the primary diagnosis of Dyspnea    Interval history: Yesterday: As per RN: patient had a bottle of xanax pills on his sheet this morning.  Patient denies taking any xanax this morning. he reports taking 4 xanax pills and some opioids pills (not sure about name) last night around 8 PM. Patient also vapes daily.     Overnight events noted. Patient more awake and alert today. now on room air.    Admit Diagnosis:  Dyspnea        PAST MEDICAL & SURGICAL HISTORY  Polysubstance abuse    No significant past surgical history    No pertinent past medical history    Polysubstance abuse    No significant past surgical history        SOCIAL HISTORY:  Negative for smoking/alcohol/drug use.     ALLERGIES:  No Known Allergies      PHYSICAL EXAM:  GEN: Lethargic, No acute distress  HEAD: atraumatic, normocephalic  EYES: pupils approx. 2 mm in diameter, sluggish  ENT: moist mucus membranes, stain of yellowish phlegm around mouth  LUNGS: Clear to auscultation bilaterally   HEART: S1/S2  ABD: Soft, NT/ND. BS +  EXT: no cyanosis/edema  NEURO: AAOX3    Intravenous access:   NG tube:   Callaway Catheter:     Vital Signs Last 24 Hrs  T(C): 37 (28 Oct 2023 14:29), Max: 37 (28 Oct 2023 14:29)  T(F): 98.6 (28 Oct 2023 14:29), Max: 98.6 (28 Oct 2023 14:29)  HR: 88 (28 Oct 2023 14:29) (84 - 100)  BP: 160/84 (28 Oct 2023 14:29) (136/69 - 160/84)  BP(mean): --  RR: 18 (28 Oct 2023 14:29) (18 - 20)  SpO2: 96% (28 Oct 2023 04:30) (91% - 97%)    Parameters below as of 27 Oct 2023 21:10  Patient On (Oxygen Delivery Method): mask, Venturi      I&Os:    MEDICATIONS:  STANDING MEDICATIONS  ampicillin/sulbactam  IVPB 3 Gram(s) IV Intermittent every 6 hours, 10-26-23 @ 20:03  pantoprazole    Tablet 40 milliGRAM(s) Oral before breakfast, 10-27-23 @ 16:18  sodium chloride 0.9%. 1000 milliLiter(s) IV Continuous <Continuous>, 10-26-23 @ 23:15    PRN MEDICATIONS  acetaminophen     Tablet .. 650 milliGRAM(s) Oral every 6 hours, 10-26-23 @ 23:15 PRN  aluminum hydroxide/magnesium hydroxide/simethicone Suspension 30 milliLiter(s) Oral every 4 hours, 10-26-23 @ 23:15 PRN  ondansetron Injectable 4 milliGRAM(s) IV Push every 8 hours, 10-26-23 @ 23:15 PRN    Diet, Regular (10-26-23 @ 23:17) [Active]    LABS:                                            11.4                  Neurophils% (auto):   77.6   (10-28 @ 07:07):    9.43 )-----------(286          Lymphocytes% (auto):  13.4                                          36.0                   Eosinphils% (auto):   1.9      Manual%: Neutrophils x    ; Lymphocytes x    ; Eosinophils x    ; Bands%: x    ; Blasts x          WBC trend: 9.43 <--, 19.33 <--, 7.93 <--  Hgb: 11.4 [10-28-23 @ 07:07]<--, 11.8 [10-27-23 @ 06:39]<--, 13.0 [10-26-23 @ 20:05]<--                              138    |  100    |  10                  Calcium: 8.5   / iCa: x      (10-28 @ 07:07)    ----------------------------<  90        Magnesium: 1.9                              4.5     |  29     |  1.0              Phosphorous: x        TPro  5.9    /  Alb  3.4    /  TBili  0.4    /  DBili  x      /  AST  20     /  ALT  45     /  AlkPhos  63     28 Oct 2023 07:07    Creatinine trend: 1.0<--, 1.1<--, 1.4<--  SODIUM TREND: Sodium 138 [10-28 @ 07:07]<--, Sodium 136 [10-27 @ 06:39]<--, Sodium 142 [10-26 @ 20:05]<--      POC Glucose:       CARDIAC MARKERS ( 26 Oct 2023 20:05 )  x     / <0.01 ng/mL / x     / x     / x            Urinalysis Basic - ( 28 Oct 2023 07:07 )    Color: x / Appearance: x / SG: x / pH: x  Gluc: 90 mg/dL / Ketone: x  / Bili: x / Urobili: x   Blood: x / Protein: x / Nitrite: x   Leuk Esterase: x / RBC: x / WBC x   Sq Epi: x / Non Sq Epi: x / Bacteria: x      RADIOLOGY:    < from: Xray Chest 1 View AP/PA (10.26.23 @ 20:39) >    Questionable right basilar opacity versus confluence of shadows.    < end of copied text >

## 2023-10-28 NOTE — PROGRESS NOTE ADULT - ASSESSMENT
43-year-old man with Hx polysubstance abuse, brought in by EMS status post opioid overdose. s/p narcan.    # Opioid overdose / polysubstance abuse  - urine tox positive for fentanyl, THC, benzodiazepines and amphetamine   - d/c IVF. encourage po hydration  - monitor VS   - addiction medicine follow up for rehab placement.  - zofran PRN      # Dyspnea  # concern for aspiration pna  - now on room air  - c/w Unasyn  - follow cultures, urine legionella, urine strep  - trend fever and WBC curve  - repeat CXR      DVT: n/a  GI: pantoprazole  Diet: Regular  Activity: Increase as tolerated  Dispo: Acute for now

## 2023-10-29 LAB
ALBUMIN SERPL ELPH-MCNC: 3.3 G/DL — LOW (ref 3.5–5.2)
ALBUMIN SERPL ELPH-MCNC: 3.3 G/DL — LOW (ref 3.5–5.2)
ALP SERPL-CCNC: 64 U/L — SIGNIFICANT CHANGE UP (ref 30–115)
ALP SERPL-CCNC: 64 U/L — SIGNIFICANT CHANGE UP (ref 30–115)
ALT FLD-CCNC: 45 U/L — HIGH (ref 0–41)
ALT FLD-CCNC: 45 U/L — HIGH (ref 0–41)
ANION GAP SERPL CALC-SCNC: 9 MMOL/L — SIGNIFICANT CHANGE UP (ref 7–14)
ANION GAP SERPL CALC-SCNC: 9 MMOL/L — SIGNIFICANT CHANGE UP (ref 7–14)
AST SERPL-CCNC: 27 U/L — SIGNIFICANT CHANGE UP (ref 0–41)
AST SERPL-CCNC: 27 U/L — SIGNIFICANT CHANGE UP (ref 0–41)
BASOPHILS # BLD AUTO: 0.05 K/UL — SIGNIFICANT CHANGE UP (ref 0–0.2)
BASOPHILS # BLD AUTO: 0.05 K/UL — SIGNIFICANT CHANGE UP (ref 0–0.2)
BASOPHILS NFR BLD AUTO: 0.7 % — SIGNIFICANT CHANGE UP (ref 0–1)
BASOPHILS NFR BLD AUTO: 0.7 % — SIGNIFICANT CHANGE UP (ref 0–1)
BILIRUB SERPL-MCNC: 0.3 MG/DL — SIGNIFICANT CHANGE UP (ref 0.2–1.2)
BILIRUB SERPL-MCNC: 0.3 MG/DL — SIGNIFICANT CHANGE UP (ref 0.2–1.2)
BUN SERPL-MCNC: 11 MG/DL — SIGNIFICANT CHANGE UP (ref 10–20)
BUN SERPL-MCNC: 11 MG/DL — SIGNIFICANT CHANGE UP (ref 10–20)
CALCIUM SERPL-MCNC: 8.5 MG/DL — SIGNIFICANT CHANGE UP (ref 8.4–10.5)
CALCIUM SERPL-MCNC: 8.5 MG/DL — SIGNIFICANT CHANGE UP (ref 8.4–10.5)
CHLORIDE SERPL-SCNC: 100 MMOL/L — SIGNIFICANT CHANGE UP (ref 98–110)
CHLORIDE SERPL-SCNC: 100 MMOL/L — SIGNIFICANT CHANGE UP (ref 98–110)
CO2 SERPL-SCNC: 29 MMOL/L — SIGNIFICANT CHANGE UP (ref 17–32)
CO2 SERPL-SCNC: 29 MMOL/L — SIGNIFICANT CHANGE UP (ref 17–32)
CREAT SERPL-MCNC: 1 MG/DL — SIGNIFICANT CHANGE UP (ref 0.7–1.5)
CREAT SERPL-MCNC: 1 MG/DL — SIGNIFICANT CHANGE UP (ref 0.7–1.5)
EGFR: 97 ML/MIN/1.73M2 — SIGNIFICANT CHANGE UP
EGFR: 97 ML/MIN/1.73M2 — SIGNIFICANT CHANGE UP
EOSINOPHIL # BLD AUTO: 0.19 K/UL — SIGNIFICANT CHANGE UP (ref 0–0.7)
EOSINOPHIL # BLD AUTO: 0.19 K/UL — SIGNIFICANT CHANGE UP (ref 0–0.7)
EOSINOPHIL NFR BLD AUTO: 2.7 % — SIGNIFICANT CHANGE UP (ref 0–8)
EOSINOPHIL NFR BLD AUTO: 2.7 % — SIGNIFICANT CHANGE UP (ref 0–8)
GLUCOSE SERPL-MCNC: 90 MG/DL — SIGNIFICANT CHANGE UP (ref 70–99)
GLUCOSE SERPL-MCNC: 90 MG/DL — SIGNIFICANT CHANGE UP (ref 70–99)
GRAM STN FLD: ABNORMAL
GRAM STN FLD: ABNORMAL
HCT VFR BLD CALC: 38.3 % — LOW (ref 42–52)
HCT VFR BLD CALC: 38.3 % — LOW (ref 42–52)
HGB BLD-MCNC: 12.2 G/DL — LOW (ref 14–18)
HGB BLD-MCNC: 12.2 G/DL — LOW (ref 14–18)
IMM GRANULOCYTES NFR BLD AUTO: 0.7 % — HIGH (ref 0.1–0.3)
IMM GRANULOCYTES NFR BLD AUTO: 0.7 % — HIGH (ref 0.1–0.3)
LYMPHOCYTES # BLD AUTO: 1.69 K/UL — SIGNIFICANT CHANGE UP (ref 1.2–3.4)
LYMPHOCYTES # BLD AUTO: 1.69 K/UL — SIGNIFICANT CHANGE UP (ref 1.2–3.4)
LYMPHOCYTES # BLD AUTO: 24.1 % — SIGNIFICANT CHANGE UP (ref 20.5–51.1)
LYMPHOCYTES # BLD AUTO: 24.1 % — SIGNIFICANT CHANGE UP (ref 20.5–51.1)
MAGNESIUM SERPL-MCNC: 1.9 MG/DL — SIGNIFICANT CHANGE UP (ref 1.8–2.4)
MAGNESIUM SERPL-MCNC: 1.9 MG/DL — SIGNIFICANT CHANGE UP (ref 1.8–2.4)
MCHC RBC-ENTMCNC: 25.8 PG — LOW (ref 27–31)
MCHC RBC-ENTMCNC: 25.8 PG — LOW (ref 27–31)
MCHC RBC-ENTMCNC: 31.9 G/DL — LOW (ref 32–37)
MCHC RBC-ENTMCNC: 31.9 G/DL — LOW (ref 32–37)
MCV RBC AUTO: 81.1 FL — SIGNIFICANT CHANGE UP (ref 80–94)
MCV RBC AUTO: 81.1 FL — SIGNIFICANT CHANGE UP (ref 80–94)
MONOCYTES # BLD AUTO: 0.67 K/UL — HIGH (ref 0.1–0.6)
MONOCYTES # BLD AUTO: 0.67 K/UL — HIGH (ref 0.1–0.6)
MONOCYTES NFR BLD AUTO: 9.5 % — HIGH (ref 1.7–9.3)
MONOCYTES NFR BLD AUTO: 9.5 % — HIGH (ref 1.7–9.3)
NEUTROPHILS # BLD AUTO: 4.37 K/UL — SIGNIFICANT CHANGE UP (ref 1.4–6.5)
NEUTROPHILS # BLD AUTO: 4.37 K/UL — SIGNIFICANT CHANGE UP (ref 1.4–6.5)
NEUTROPHILS NFR BLD AUTO: 62.3 % — SIGNIFICANT CHANGE UP (ref 42.2–75.2)
NEUTROPHILS NFR BLD AUTO: 62.3 % — SIGNIFICANT CHANGE UP (ref 42.2–75.2)
NRBC # BLD: 0 /100 WBCS — SIGNIFICANT CHANGE UP (ref 0–0)
NRBC # BLD: 0 /100 WBCS — SIGNIFICANT CHANGE UP (ref 0–0)
PLATELET # BLD AUTO: 302 K/UL — SIGNIFICANT CHANGE UP (ref 130–400)
PLATELET # BLD AUTO: 302 K/UL — SIGNIFICANT CHANGE UP (ref 130–400)
PMV BLD: 8.9 FL — SIGNIFICANT CHANGE UP (ref 7.4–10.4)
PMV BLD: 8.9 FL — SIGNIFICANT CHANGE UP (ref 7.4–10.4)
POTASSIUM SERPL-MCNC: 4.1 MMOL/L — SIGNIFICANT CHANGE UP (ref 3.5–5)
POTASSIUM SERPL-MCNC: 4.1 MMOL/L — SIGNIFICANT CHANGE UP (ref 3.5–5)
POTASSIUM SERPL-SCNC: 4.1 MMOL/L — SIGNIFICANT CHANGE UP (ref 3.5–5)
POTASSIUM SERPL-SCNC: 4.1 MMOL/L — SIGNIFICANT CHANGE UP (ref 3.5–5)
PROT SERPL-MCNC: 5.7 G/DL — LOW (ref 6–8)
PROT SERPL-MCNC: 5.7 G/DL — LOW (ref 6–8)
RBC # BLD: 4.72 M/UL — SIGNIFICANT CHANGE UP (ref 4.7–6.1)
RBC # BLD: 4.72 M/UL — SIGNIFICANT CHANGE UP (ref 4.7–6.1)
RBC # FLD: 15 % — HIGH (ref 11.5–14.5)
RBC # FLD: 15 % — HIGH (ref 11.5–14.5)
S PNEUM AG UR QL: NEGATIVE — SIGNIFICANT CHANGE UP
S PNEUM AG UR QL: NEGATIVE — SIGNIFICANT CHANGE UP
SODIUM SERPL-SCNC: 138 MMOL/L — SIGNIFICANT CHANGE UP (ref 135–146)
SODIUM SERPL-SCNC: 138 MMOL/L — SIGNIFICANT CHANGE UP (ref 135–146)
SPECIMEN SOURCE: SIGNIFICANT CHANGE UP
SPECIMEN SOURCE: SIGNIFICANT CHANGE UP
WBC # BLD: 7.02 K/UL — SIGNIFICANT CHANGE UP (ref 4.8–10.8)
WBC # BLD: 7.02 K/UL — SIGNIFICANT CHANGE UP (ref 4.8–10.8)
WBC # FLD AUTO: 7.02 K/UL — SIGNIFICANT CHANGE UP (ref 4.8–10.8)
WBC # FLD AUTO: 7.02 K/UL — SIGNIFICANT CHANGE UP (ref 4.8–10.8)

## 2023-10-29 PROCEDURE — 99232 SBSQ HOSP IP/OBS MODERATE 35: CPT

## 2023-10-29 RX ORDER — HYDROXYZINE HCL 10 MG
25 TABLET ORAL ONCE
Refills: 0 | Status: COMPLETED | OUTPATIENT
Start: 2023-10-29 | End: 2023-10-29

## 2023-10-29 RX ORDER — LANOLIN ALCOHOL/MO/W.PET/CERES
5 CREAM (GRAM) TOPICAL AT BEDTIME
Refills: 0 | Status: DISCONTINUED | OUTPATIENT
Start: 2023-10-29 | End: 2023-11-01

## 2023-10-29 RX ADMIN — AMPICILLIN SODIUM AND SULBACTAM SODIUM 200 GRAM(S): 250; 125 INJECTION, POWDER, FOR SUSPENSION INTRAMUSCULAR; INTRAVENOUS at 11:32

## 2023-10-29 RX ADMIN — AMPICILLIN SODIUM AND SULBACTAM SODIUM 200 GRAM(S): 250; 125 INJECTION, POWDER, FOR SUSPENSION INTRAMUSCULAR; INTRAVENOUS at 17:08

## 2023-10-29 RX ADMIN — AMPICILLIN SODIUM AND SULBACTAM SODIUM 200 GRAM(S): 250; 125 INJECTION, POWDER, FOR SUSPENSION INTRAMUSCULAR; INTRAVENOUS at 23:35

## 2023-10-29 RX ADMIN — AMPICILLIN SODIUM AND SULBACTAM SODIUM 200 GRAM(S): 250; 125 INJECTION, POWDER, FOR SUSPENSION INTRAMUSCULAR; INTRAVENOUS at 02:43

## 2023-10-29 RX ADMIN — Medication 650 MILLIGRAM(S): at 23:35

## 2023-10-29 RX ADMIN — AMPICILLIN SODIUM AND SULBACTAM SODIUM 200 GRAM(S): 250; 125 INJECTION, POWDER, FOR SUSPENSION INTRAMUSCULAR; INTRAVENOUS at 05:17

## 2023-10-29 RX ADMIN — PANTOPRAZOLE SODIUM 40 MILLIGRAM(S): 20 TABLET, DELAYED RELEASE ORAL at 05:19

## 2023-10-29 RX ADMIN — Medication 25 MILLIGRAM(S): at 17:29

## 2023-10-29 RX ADMIN — Medication 650 MILLIGRAM(S): at 22:34

## 2023-10-29 NOTE — PROGRESS NOTE ADULT - ASSESSMENT
43-year-old man with Hx polysubstance abuse, brought in by EMS status post opioid overdose. s/p narcan.    # Opioid overdose / polysubstance abuse  - urine tox positive for fentanyl, THC, benzodiazepines and amphetamine   - d/c IVF. encourage po hydration  - monitor VS   - addiction medicine follow up for rehab placement.  - zofran PRN    # Dyspnea  # concern for aspiration pna  - now on room air  - c/w Unasyn  - follow cultures, urine legionella, urine strep  - trend fever and WBC curve  - repeat CXR    DVT: n/a  GI: pantoprazole  Diet: Regular  Activity: Increase as tolerated  Dispo: Acute for now     43-year-old man with Hx polysubstance abuse, brought in by EMS status post opioid overdose. s/p narcan.    # Opioid overdose / polysubstance abuse  - urine tox positive for fentanyl, THC, benzodiazepines and amphetamine   - d/c IVF. encourage po hydration  - monitor VS   - addiction medicine follow up for rehab placement.  - zofran PRN    # Dyspnea  # concern for aspiration pna  - now on room air  - c/w Unasyn  - follow cultures, urine legionella, urine strep  - trend fever and WBC curve  - repeat CXR    DVT: n/a  GI: pantoprazole  Diet: Regular  Activity: Increase as tolerated  Dispo: Anticipate for tomorrow

## 2023-10-29 NOTE — PROGRESS NOTE ADULT - SUBJECTIVE AND OBJECTIVE BOX
SUBJECTIVE:    Patient is a 43y old Male who presents with a chief complaint of   Currently admitted to medicine with the primary diagnosis of Dyspnea    Interval history: Yesterday: As per RN: patient had a bottle of xanax pills on his sheet this morning.  Patient denies taking any xanax this morning. he reports taking 4 xanax pills and some opioids pills (not sure about name) last night around 8 PM. Patient also vapes daily.     Overnight events noted. Patient more awake and alert today. now on room air.    Admit Diagnosis:  Dyspnea        PAST MEDICAL & SURGICAL HISTORY  Polysubstance abuse    No significant past surgical history    No pertinent past medical history    Polysubstance abuse    No significant past surgical history        SOCIAL HISTORY:  Negative for smoking/alcohol/drug use.     ALLERGIES:  No Known Allergies      PHYSICAL EXAM:  GEN: Lethargic, No acute distress  HEAD: atraumatic, normocephalic  EYES: pupils approx. 2 mm in diameter, sluggish  ENT: moist mucus membranes, stain of yellowish phlegm around mouth  LUNGS: Clear to auscultation bilaterally   HEART: S1/S2  ABD: Soft, NT/ND. BS +  EXT: no cyanosis/edema  NEURO: AAOX3      Vital Signs Last 24 Hrs  T(C): 36.2 (29 Oct 2023 13:39), Max: 36.4 (28 Oct 2023 21:00)  T(F): 97.1 (29 Oct 2023 13:39), Max: 97.5 (28 Oct 2023 21:00)  HR: 82 (29 Oct 2023 13:39) (74 - 84)  BP: 150/84 (29 Oct 2023 13:39) (150/84 - 158/80)  BP(mean): --  RR: 18 (29 Oct 2023 13:39) (18 - 18)  SpO2: 97% (28 Oct 2023 21:00) (97% - 97%)    Parameters below as of 28 Oct 2023 21:00  Patient On (Oxygen Delivery Method): mask, Venturi      I&Os:  10-28-23 @ 07:01  -  10-29-23 @ 07:00  --------------------------------------------------------  IN: 0 mL / OUT: 1000 mL / NET: -1000 mL        MEDICATIONS:  STANDING MEDICATIONS  ampicillin/sulbactam  IVPB 3 Gram(s) IV Intermittent every 6 hours, 10-26-23 @ 20:03  pantoprazole    Tablet 40 milliGRAM(s) Oral before breakfast, 10-27-23 @ 16:18    PRN MEDICATIONS  acetaminophen     Tablet .. 650 milliGRAM(s) Oral every 6 hours, 10-26-23 @ 23:15 PRN  aluminum hydroxide/magnesium hydroxide/simethicone Suspension 30 milliLiter(s) Oral every 4 hours, 10-26-23 @ 23:15 PRN  ondansetron Injectable 4 milliGRAM(s) IV Push every 8 hours, 10-26-23 @ 23:15 PRN    Diet, Regular (10-26-23 @ 23:17) [Active]    LABS:                                            12.2                  Neurophils% (auto):   62.3   (10-29 @ 07:21):    7.02 )-----------(302          Lymphocytes% (auto):  24.1                                          38.3                   Eosinphils% (auto):   2.7      Manual%: Neutrophils x    ; Lymphocytes x    ; Eosinophils x    ; Bands%: x    ; Blasts x          WBC trend: 7.02 <--, 9.43 <--, 19.33 <--, 7.93 <--  Hgb: 12.2 [10-29-23 @ 07:21]<--, 11.4 [10-28-23 @ 07:07]<--, 11.8 [10-27-23 @ 06:39]<--, 13.0 [10-26-23 @ 20:05]<--                              138    |  100    |  11                  Calcium: 8.5   / iCa: x      (10-29 @ 07:21)    ----------------------------<  90        Magnesium: 1.9                              4.1     |  29     |  1.0              Phosphorous: x        TPro  5.7    /  Alb  3.3    /  TBili  0.3    /  DBili  x      /  AST  27     /  ALT  45     /  AlkPhos  64     29 Oct 2023 07:21    Creatinine trend: 1.0<--, 1.0<--, 1.1<--, 1.4<--  SODIUM TREND: Sodium 138 [10-29 @ 07:21]<--, Sodium 138 [10-28 @ 07:07]<--, Sodium 136 [10-27 @ 06:39]<--, Sodium 142 [10-26 @ 20:05]<--      POC Glucose:           Culture - Sputum (collected 28 Oct 2023 19:24)  Source: .Sputum Sputum  Gram Stain (29 Oct 2023 06:06):    Moderate polymorphonuclear leukocytes per low power field    Rare Squamous epithelial cells per low power field    Rare Gram positive cocci in pairs per oil power field    Culture - Blood (collected 27 Oct 2023 18:43)  Source: .Blood Blood-Peripheral  Preliminary Report (29 Oct 2023 03:01):    No growth at 24 hours        Culture - Sputum (collected 10-28-23 @ 19:24)  Source: .Sputum Sputum  Gram Stain (10-29-23 @ 06:06):    Moderate polymorphonuclear leukocytes per low power field    Rare Squamous epithelial cells per low power field    Rare Gram positive cocci in pairs per oil power field    Culture - Blood (collected 10-27-23 @ 18:43)  Source: .Blood Blood-Peripheral  Preliminary Report (10-29-23 @ 03:01):    No growth at 24 hours        Urinalysis Basic - ( 29 Oct 2023 07:21 )    Color: x / Appearance: x / SG: x / pH: x  Gluc: 90 mg/dL / Ketone: x  / Bili: x / Urobili: x   Blood: x / Protein: x / Nitrite: x   Leuk Esterase: x / RBC: x / WBC x   Sq Epi: x / Non Sq Epi: x / Bacteria: x      RADIOLOGY:    < from: Xray Chest 1 View AP/PA (10.26.23 @ 20:39) >    Questionable right basilar opacity versus confluence of shadows.    < end of copied text >

## 2023-10-30 ENCOUNTER — TRANSCRIPTION ENCOUNTER (OUTPATIENT)
Age: 43
End: 2023-10-30

## 2023-10-30 DIAGNOSIS — F19.10 OTHER PSYCHOACTIVE SUBSTANCE ABUSE, UNCOMPLICATED: ICD-10-CM

## 2023-10-30 LAB
ALBUMIN SERPL ELPH-MCNC: 3.4 G/DL — LOW (ref 3.5–5.2)
ALBUMIN SERPL ELPH-MCNC: 3.4 G/DL — LOW (ref 3.5–5.2)
ALP SERPL-CCNC: 72 U/L — SIGNIFICANT CHANGE UP (ref 30–115)
ALP SERPL-CCNC: 72 U/L — SIGNIFICANT CHANGE UP (ref 30–115)
ALT FLD-CCNC: 40 U/L — SIGNIFICANT CHANGE UP (ref 0–41)
ALT FLD-CCNC: 40 U/L — SIGNIFICANT CHANGE UP (ref 0–41)
ANION GAP SERPL CALC-SCNC: 11 MMOL/L — SIGNIFICANT CHANGE UP (ref 7–14)
ANION GAP SERPL CALC-SCNC: 11 MMOL/L — SIGNIFICANT CHANGE UP (ref 7–14)
AST SERPL-CCNC: 22 U/L — SIGNIFICANT CHANGE UP (ref 0–41)
AST SERPL-CCNC: 22 U/L — SIGNIFICANT CHANGE UP (ref 0–41)
BASOPHILS # BLD AUTO: 0.03 K/UL — SIGNIFICANT CHANGE UP (ref 0–0.2)
BASOPHILS # BLD AUTO: 0.03 K/UL — SIGNIFICANT CHANGE UP (ref 0–0.2)
BASOPHILS NFR BLD AUTO: 0.4 % — SIGNIFICANT CHANGE UP (ref 0–1)
BASOPHILS NFR BLD AUTO: 0.4 % — SIGNIFICANT CHANGE UP (ref 0–1)
BILIRUB SERPL-MCNC: <0.2 MG/DL — SIGNIFICANT CHANGE UP (ref 0.2–1.2)
BILIRUB SERPL-MCNC: <0.2 MG/DL — SIGNIFICANT CHANGE UP (ref 0.2–1.2)
BUN SERPL-MCNC: 11 MG/DL — SIGNIFICANT CHANGE UP (ref 10–20)
BUN SERPL-MCNC: 11 MG/DL — SIGNIFICANT CHANGE UP (ref 10–20)
CALCIUM SERPL-MCNC: 8.5 MG/DL — SIGNIFICANT CHANGE UP (ref 8.4–10.5)
CALCIUM SERPL-MCNC: 8.5 MG/DL — SIGNIFICANT CHANGE UP (ref 8.4–10.5)
CHLORIDE SERPL-SCNC: 102 MMOL/L — SIGNIFICANT CHANGE UP (ref 98–110)
CHLORIDE SERPL-SCNC: 102 MMOL/L — SIGNIFICANT CHANGE UP (ref 98–110)
CO2 SERPL-SCNC: 26 MMOL/L — SIGNIFICANT CHANGE UP (ref 17–32)
CO2 SERPL-SCNC: 26 MMOL/L — SIGNIFICANT CHANGE UP (ref 17–32)
CREAT SERPL-MCNC: 1.2 MG/DL — SIGNIFICANT CHANGE UP (ref 0.7–1.5)
CREAT SERPL-MCNC: 1.2 MG/DL — SIGNIFICANT CHANGE UP (ref 0.7–1.5)
CULTURE RESULTS: ABNORMAL
CULTURE RESULTS: ABNORMAL
EGFR: 78 ML/MIN/1.73M2 — SIGNIFICANT CHANGE UP
EGFR: 78 ML/MIN/1.73M2 — SIGNIFICANT CHANGE UP
EOSINOPHIL # BLD AUTO: 0.37 K/UL — SIGNIFICANT CHANGE UP (ref 0–0.7)
EOSINOPHIL # BLD AUTO: 0.37 K/UL — SIGNIFICANT CHANGE UP (ref 0–0.7)
EOSINOPHIL NFR BLD AUTO: 4.6 % — SIGNIFICANT CHANGE UP (ref 0–8)
EOSINOPHIL NFR BLD AUTO: 4.6 % — SIGNIFICANT CHANGE UP (ref 0–8)
GLUCOSE SERPL-MCNC: 124 MG/DL — HIGH (ref 70–99)
GLUCOSE SERPL-MCNC: 124 MG/DL — HIGH (ref 70–99)
HCT VFR BLD CALC: 37.6 % — LOW (ref 42–52)
HCT VFR BLD CALC: 37.6 % — LOW (ref 42–52)
HGB BLD-MCNC: 12 G/DL — LOW (ref 14–18)
HGB BLD-MCNC: 12 G/DL — LOW (ref 14–18)
IMM GRANULOCYTES NFR BLD AUTO: 1.2 % — HIGH (ref 0.1–0.3)
IMM GRANULOCYTES NFR BLD AUTO: 1.2 % — HIGH (ref 0.1–0.3)
LEGIONELLA AG UR QL: NEGATIVE — SIGNIFICANT CHANGE UP
LEGIONELLA AG UR QL: NEGATIVE — SIGNIFICANT CHANGE UP
LYMPHOCYTES # BLD AUTO: 1.68 K/UL — SIGNIFICANT CHANGE UP (ref 1.2–3.4)
LYMPHOCYTES # BLD AUTO: 1.68 K/UL — SIGNIFICANT CHANGE UP (ref 1.2–3.4)
LYMPHOCYTES # BLD AUTO: 20.7 % — SIGNIFICANT CHANGE UP (ref 20.5–51.1)
LYMPHOCYTES # BLD AUTO: 20.7 % — SIGNIFICANT CHANGE UP (ref 20.5–51.1)
MAGNESIUM SERPL-MCNC: 1.7 MG/DL — LOW (ref 1.8–2.4)
MAGNESIUM SERPL-MCNC: 1.7 MG/DL — LOW (ref 1.8–2.4)
MCHC RBC-ENTMCNC: 25.6 PG — LOW (ref 27–31)
MCHC RBC-ENTMCNC: 25.6 PG — LOW (ref 27–31)
MCHC RBC-ENTMCNC: 31.9 G/DL — LOW (ref 32–37)
MCHC RBC-ENTMCNC: 31.9 G/DL — LOW (ref 32–37)
MCV RBC AUTO: 80.2 FL — SIGNIFICANT CHANGE UP (ref 80–94)
MCV RBC AUTO: 80.2 FL — SIGNIFICANT CHANGE UP (ref 80–94)
MONOCYTES # BLD AUTO: 0.61 K/UL — HIGH (ref 0.1–0.6)
MONOCYTES # BLD AUTO: 0.61 K/UL — HIGH (ref 0.1–0.6)
MONOCYTES NFR BLD AUTO: 7.5 % — SIGNIFICANT CHANGE UP (ref 1.7–9.3)
MONOCYTES NFR BLD AUTO: 7.5 % — SIGNIFICANT CHANGE UP (ref 1.7–9.3)
NEUTROPHILS # BLD AUTO: 5.34 K/UL — SIGNIFICANT CHANGE UP (ref 1.4–6.5)
NEUTROPHILS # BLD AUTO: 5.34 K/UL — SIGNIFICANT CHANGE UP (ref 1.4–6.5)
NEUTROPHILS NFR BLD AUTO: 65.6 % — SIGNIFICANT CHANGE UP (ref 42.2–75.2)
NEUTROPHILS NFR BLD AUTO: 65.6 % — SIGNIFICANT CHANGE UP (ref 42.2–75.2)
NRBC # BLD: 0 /100 WBCS — SIGNIFICANT CHANGE UP (ref 0–0)
NRBC # BLD: 0 /100 WBCS — SIGNIFICANT CHANGE UP (ref 0–0)
PLATELET # BLD AUTO: 356 K/UL — SIGNIFICANT CHANGE UP (ref 130–400)
PLATELET # BLD AUTO: 356 K/UL — SIGNIFICANT CHANGE UP (ref 130–400)
PMV BLD: 9 FL — SIGNIFICANT CHANGE UP (ref 7.4–10.4)
PMV BLD: 9 FL — SIGNIFICANT CHANGE UP (ref 7.4–10.4)
POTASSIUM SERPL-MCNC: 4.1 MMOL/L — SIGNIFICANT CHANGE UP (ref 3.5–5)
POTASSIUM SERPL-MCNC: 4.1 MMOL/L — SIGNIFICANT CHANGE UP (ref 3.5–5)
POTASSIUM SERPL-SCNC: 4.1 MMOL/L — SIGNIFICANT CHANGE UP (ref 3.5–5)
POTASSIUM SERPL-SCNC: 4.1 MMOL/L — SIGNIFICANT CHANGE UP (ref 3.5–5)
PROT SERPL-MCNC: 5.8 G/DL — LOW (ref 6–8)
PROT SERPL-MCNC: 5.8 G/DL — LOW (ref 6–8)
RBC # BLD: 4.69 M/UL — LOW (ref 4.7–6.1)
RBC # BLD: 4.69 M/UL — LOW (ref 4.7–6.1)
RBC # FLD: 15.1 % — HIGH (ref 11.5–14.5)
RBC # FLD: 15.1 % — HIGH (ref 11.5–14.5)
SODIUM SERPL-SCNC: 139 MMOL/L — SIGNIFICANT CHANGE UP (ref 135–146)
SODIUM SERPL-SCNC: 139 MMOL/L — SIGNIFICANT CHANGE UP (ref 135–146)
SPECIMEN SOURCE: SIGNIFICANT CHANGE UP
SPECIMEN SOURCE: SIGNIFICANT CHANGE UP
WBC # BLD: 8.13 K/UL — SIGNIFICANT CHANGE UP (ref 4.8–10.8)
WBC # BLD: 8.13 K/UL — SIGNIFICANT CHANGE UP (ref 4.8–10.8)
WBC # FLD AUTO: 8.13 K/UL — SIGNIFICANT CHANGE UP (ref 4.8–10.8)
WBC # FLD AUTO: 8.13 K/UL — SIGNIFICANT CHANGE UP (ref 4.8–10.8)

## 2023-10-30 PROCEDURE — 99231 SBSQ HOSP IP/OBS SF/LOW 25: CPT

## 2023-10-30 PROCEDURE — 99232 SBSQ HOSP IP/OBS MODERATE 35: CPT

## 2023-10-30 RX ORDER — HYDROXYZINE HCL 10 MG
25 TABLET ORAL EVERY 8 HOURS
Refills: 0 | Status: DISCONTINUED | OUTPATIENT
Start: 2023-10-30 | End: 2023-11-01

## 2023-10-30 RX ORDER — MAGNESIUM OXIDE 400 MG ORAL TABLET 241.3 MG
400 TABLET ORAL
Refills: 0 | Status: COMPLETED | OUTPATIENT
Start: 2023-10-30 | End: 2023-11-01

## 2023-10-30 RX ORDER — HYDROXYZINE HCL 10 MG
25 TABLET ORAL ONCE
Refills: 0 | Status: COMPLETED | OUTPATIENT
Start: 2023-10-30 | End: 2023-10-30

## 2023-10-30 RX ADMIN — Medication 650 MILLIGRAM(S): at 21:30

## 2023-10-30 RX ADMIN — Medication 25 MILLIGRAM(S): at 11:38

## 2023-10-30 RX ADMIN — AMPICILLIN SODIUM AND SULBACTAM SODIUM 200 GRAM(S): 250; 125 INJECTION, POWDER, FOR SUSPENSION INTRAMUSCULAR; INTRAVENOUS at 17:21

## 2023-10-30 RX ADMIN — AMPICILLIN SODIUM AND SULBACTAM SODIUM 200 GRAM(S): 250; 125 INJECTION, POWDER, FOR SUSPENSION INTRAMUSCULAR; INTRAVENOUS at 23:18

## 2023-10-30 RX ADMIN — PANTOPRAZOLE SODIUM 40 MILLIGRAM(S): 20 TABLET, DELAYED RELEASE ORAL at 05:08

## 2023-10-30 RX ADMIN — MAGNESIUM OXIDE 400 MG ORAL TABLET 400 MILLIGRAM(S): 241.3 TABLET ORAL at 17:21

## 2023-10-30 RX ADMIN — AMPICILLIN SODIUM AND SULBACTAM SODIUM 200 GRAM(S): 250; 125 INJECTION, POWDER, FOR SUSPENSION INTRAMUSCULAR; INTRAVENOUS at 11:39

## 2023-10-30 RX ADMIN — AMPICILLIN SODIUM AND SULBACTAM SODIUM 200 GRAM(S): 250; 125 INJECTION, POWDER, FOR SUSPENSION INTRAMUSCULAR; INTRAVENOUS at 05:07

## 2023-10-30 RX ADMIN — Medication 25 MILLIGRAM(S): at 21:30

## 2023-10-30 RX ADMIN — MAGNESIUM OXIDE 400 MG ORAL TABLET 400 MILLIGRAM(S): 241.3 TABLET ORAL at 12:02

## 2023-10-30 NOTE — DISCHARGE NOTE PROVIDER - NSDCFUSCHEDAPPT_GEN_ALL_CORE_FT
Dixie Birmingham  [facility] PreAdmits  Scheduled Appointment: 11/07/2023    [facility] Physician [facility]  PSYCHIATRY [facility] [place]  Scheduled Appointment: 11/07/2023

## 2023-10-30 NOTE — DISCHARGE NOTE PROVIDER - CARE PROVIDERS DIRECT ADDRESSES
,DirectAddress_Unknown ,[email or web address]

## 2023-10-30 NOTE — PROGRESS NOTE ADULT - SUBJECTIVE AND OBJECTIVE BOX
SUBJECTIVE:    Patient is a 43y old Male who presents with a chief complaint of   Currently admitted to medicine with the primary diagnosis of Dyspnea    Interval history: Yesterday: As per RN: patient had a bottle of xanax pills on his sheet this morning.  Patient denies taking any xanax this morning. he reports taking 4 xanax pills and some opioids pills (not sure about name) last night around 8 PM. Patient also vapes daily.     Overnight events noted. Patient more awake and alert today. now on room air. denies any new complaints. Patient spoke with addiction team today. He initially refused in-patient rehab and had out-patient addiction f/up scheduled but later he decided to go for in-patient rehab. addiction team on board, working on placement.    Admit Diagnosis:  Dyspnea        PAST MEDICAL & SURGICAL HISTORY  Polysubstance abuse    No significant past surgical history    No pertinent past medical history    Polysubstance abuse    No significant past surgical history        SOCIAL HISTORY:  Negative for smoking/alcohol/drug use.     ALLERGIES:  No Known Allergies      PHYSICAL EXAM:  GEN: Lethargic, No acute distress  HEAD: atraumatic, normocephalic  EYES: EOMI, PERRL  ENT: moist mucus membranes, stain of yellowish phlegm around mouth  LUNGS: Clear to auscultation bilaterally   HEART: S1/S2  ABD: Soft, NT/ND. BS +  EXT: no cyanosis/edema  NEURO: AAOX3    Vital Signs Last 24 Hrs  T(C): 35.8 (30 Oct 2023 21:41), Max: 36 (30 Oct 2023 05:00)  T(F): 96.4 (30 Oct 2023 21:41), Max: 96.8 (30 Oct 2023 05:00)  HR: 56 (30 Oct 2023 21:41) (56 - 77)  BP: 157/90 (30 Oct 2023 21:41) (145/84 - 165/93)  BP(mean): --  RR: 18 (30 Oct 2023 21:41) (18 - 18)  SpO2: 100% (30 Oct 2023 21:41) (100% - 100%)    Parameters below as of 30 Oct 2023 21:41  Patient On (Oxygen Delivery Method): room air      I&Os:    MEDICATIONS:  STANDING MEDICATIONS  ampicillin/sulbactam  IVPB 3 Gram(s) IV Intermittent every 6 hours, 10-26-23 @ 20:03  magnesium oxide 400 milliGRAM(s) Oral three times a day with meals, 10-30-23 @ 11:55  pantoprazole    Tablet 40 milliGRAM(s) Oral before breakfast, 10-27-23 @ 16:18    PRN MEDICATIONS  acetaminophen     Tablet .. 650 milliGRAM(s) Oral every 6 hours, 10-26-23 @ 23:15 PRN  aluminum hydroxide/magnesium hydroxide/simethicone Suspension 30 milliLiter(s) Oral every 4 hours, 10-26-23 @ 23:15 PRN  hydrOXYzine hydrochloride 25 milliGRAM(s) Oral every 8 hours, 10-30-23 @ 20:01 PRN  melatonin 5 milliGRAM(s) Oral at bedtime, 10-29-23 @ 22:46 PRN  ondansetron Injectable 4 milliGRAM(s) IV Push every 8 hours, 10-26-23 @ 23:15 PRN    Diet, Regular (10-26-23 @ 23:17) [Active]    LABS:                                            12.0                  Neurophils% (auto):   65.6   (10-30 @ 08:25):    8.13 )-----------(356          Lymphocytes% (auto):  20.7                                          37.6                   Eosinphils% (auto):   4.6      Manual%: Neutrophils x    ; Lymphocytes x    ; Eosinophils x    ; Bands%: x    ; Blasts x          WBC trend: 8.13 <--, 7.02 <--, 9.43 <--, 19.33 <--  Hgb: 12.0 [10-30-23 @ 08:25]<--, 12.2 [10-29-23 @ 07:21]<--, 11.4 [10-28-23 @ 07:07]<--, 11.8 [10-27-23 @ 06:39]<--, 13.0 [10-26-23 @ 20:05]<--                              139    |  102    |  11                  Calcium: 8.5   / iCa: x      (10-30 @ 08:25)    ----------------------------<  124       Magnesium: 1.7                              4.1     |  26     |  1.2              Phosphorous: x        TPro  5.8    /  Alb  3.4    /  TBili  <0.2   /  DBili  x      /  AST  22     /  ALT  40     /  AlkPhos  72     30 Oct 2023 08:25    Creatinine trend: 1.2<--, 1.0<--, 1.0<--, 1.1<--, 1.4<--  SODIUM TREND: Sodium 139 [10-30 @ 08:25]<--, Sodium 138 [10-29 @ 07:21]<--, Sodium 138 [10-28 @ 07:07]<--, Sodium 136 [10-27 @ 06:39]<--, Sodium 142 [10-26 @ 20:05]<--      POC Glucose:       Culture - Sputum (collected 28 Oct 2023 19:24)  Source: .Sputum Sputum  Gram Stain (29 Oct 2023 06:06):    Moderate polymorphonuclear leukocytes per low power field    Rare Squamous epithelial cells per low power field    Rare Gram positive cocci in pairs per oil power field  Final Report (30 Oct 2023 20:02):    Normal Respiratory Estee present        Culture - Sputum (collected 10-28-23 @ 19:24)  Source: .Sputum Sputum  Gram Stain (10-29-23 @ 06:06):    Moderate polymorphonuclear leukocytes per low power field    Rare Squamous epithelial cells per low power field    Rare Gram positive cocci in pairs per oil power field  Final Report (10-30-23 @ 20:02):    Normal Respiratory Estee present    Culture - Blood (collected 10-27-23 @ 18:43)  Source: .Blood Blood-Peripheral  Preliminary Report (10-30-23 @ 03:01):    No growth at 48 Hours        Urinalysis Basic - ( 30 Oct 2023 08:25 )    Color: x / Appearance: x / SG: x / pH: x  Gluc: 124 mg/dL / Ketone: x  / Bili: x / Urobili: x   Blood: x / Protein: x / Nitrite: x   Leuk Esterase: x / RBC: x / WBC x   Sq Epi: x / Non Sq Epi: x / Bacteria: x        RADIOLOGY:    < from: Xray Chest 1 View AP/PA (10.28.23 @ 19:11) >  Low lung volume.    Right basilar opacity, unchanged.    < end of copied text >    < from: Xray Chest 1 View AP/PA (10.26.23 @ 20:39) >    Questionable right basilar opacity versus confluence of shadows.    < end of copied text >

## 2023-10-30 NOTE — DISCHARGE NOTE PROVIDER - PROVIDER TOKENS
FREE:[LAST:[your primary care provider],PHONE:[(   )    -],FAX:[(   )    -]] PROVIDER:[TOKEN:[[id number]]]

## 2023-10-30 NOTE — DISCHARGE NOTE PROVIDER - HOSPITAL COURSE
43-year-old man with Hx opoid abuse, brought in by EMS status post opioid overdose.  Per EMS, patient's girlfriend noted that the patient came in and laid himself on the floor.  She did notice that he became unresponsive and called 911.  On paramedics arrival, patient is unresponsive, cyanotic, with agonal breathing.  Patient given Narcan 4 mg intranasally after which he became awake alert and oriented x3.  Patient then vomited.  Was initially 75% on room air in the field.  Patient placed on CPAP at PEEP 10 by EMS.  On arrival, patient complaining of dyspnea, with crackles on exam; 88% on room air.  Patient placed on BiPAP 12/6, FiO2 of 60%. Patient admits he took pills, no  IV drug use. endorsed Hx of OD in 2020 as well. Was prescribed oxy's for chronic pain which led to heroin abuse. Currently states just taking oxycodone. Patient was weaned off oxygen without complication.  Urine tox screen positive for amphetamine, benzo, THC, and oxycodone. Patient was seen and evaluated by addiction medicine. He opted for outpatient treatment and will follow up with catch team for aftercare. Patient was also treated empirically with unasyn for possible aspiration pneumonia. Patient is without signs or symptoms of withdrawal and is medically clear for discharge. 43-year-old man with Hx opoid abuse, brought in by EMS status post opioid overdose.  Per EMS, patient's girlfriend noted that the patient came in and laid himself on the floor.  She did notice that he became unresponsive and called 911.  On paramedics arrival, patient is unresponsive, cyanotic, with agonal breathing.  Patient given Narcan 4 mg intranasally after which he became awake alert and oriented x3.  Patient then vomited.  Was initially 75% on room air in the field.  Patient placed on CPAP at PEEP 10 by EMS.  On arrival, patient complaining of dyspnea, with crackles on exam; 88% on room air.  Patient placed on BiPAP 12/6, FiO2 of 60%. Patient admits he took pills, no  IV drug use. endorsed Hx of OD in 2020 as well. Was prescribed oxy's for chronic pain which led to heroin abuse. Currently states just taking oxycodone. Patient was weaned off oxygen without complication.  Urine tox screen positive for amphetamine, benzo, THC, and oxycodone. Patient was seen and evaluated by addiction medicine. He opted for outpatient treatment and will follow up with catch team for aftercare. Patient was also treated empirically with unasyn for possible aspiration pneumonia. Patient without signs or symptoms of withdrawal and is medically clear for discharge.

## 2023-10-30 NOTE — DISCHARGE NOTE PROVIDER - NSDCCPCAREPLAN_GEN_ALL_CORE_FT
PRINCIPAL DISCHARGE DIAGNOSIS  Diagnosis: Opioid overdose  Assessment and Plan of Treatment: You were treated with narcan for an opioid overdose which had caused you to stop breathing. Please follow up with the CATCH team as an outpaitent. You are highly encouraged to avoid using opioids and other ilicit substances.      SECONDARY DISCHARGE DIAGNOSES  Diagnosis: Nausea & vomiting  Assessment and Plan of Treatment:     Diagnosis: Opioid overdose  Assessment and Plan of Treatment:      PRINCIPAL DISCHARGE DIAGNOSIS  Diagnosis: Opioid overdose  Assessment and Plan of Treatment: You were treated with narcan for an opioid overdose which had caused you to stop breathing. Please follow up with the CATCH team as an outpaitent. You are highly encouraged to avoid using opioids and other ilicit substances. Please carry narcan in the event that you overdose again      SECONDARY DISCHARGE DIAGNOSES  Diagnosis: Nausea & vomiting  Assessment and Plan of Treatment:     Diagnosis: Opioid overdose  Assessment and Plan of Treatment:      PRINCIPAL DISCHARGE DIAGNOSIS  Diagnosis: Opioid overdose  Assessment and Plan of Treatment: You were treated with narcan for an opioid overdose which had caused you to stop breathing. Please follow up with the CATCH team as an outpaitent. You are highly encouraged to avoid using opioids and other ilicit substances. Please carry narcan in the event that you overdose again      SECONDARY DISCHARGE DIAGNOSES  Diagnosis: Pneumonia, aspiration  Assessment and Plan of Treatment: You were empirically treated for Aspiration Pneumonia and completed antibiotics in the hospital

## 2023-10-30 NOTE — DISCHARGE NOTE PROVIDER - CARE PROVIDER_API CALL
your primary care provider,   Phone: (   )    -  Fax: (   )    -  Follow Up Time:    Dixie Birmingham  Addiction Psychiatry  [address] [place] [id number]  Phone: [phone]  Fax: [phone]  Follow Up Time:

## 2023-10-30 NOTE — PROGRESS NOTE ADULT - ASSESSMENT
43-year-old man with Hx polysubstance abuse, brought in by EMS status post opioid overdose. s/p narcan.    # Opioid overdose / polysubstance abuse  - urine tox positive for fentanyl, THC, benzodiazepines and amphetamine   - d/c IVF. encourage po hydration  - monitor VS   - addiction medicine: working on in-patient rehab placement.  - zofran PRN    # Dyspnea  # Aspiration pna  - now on room air  - c/w Unasyn switch to augmentin on discharge to complete total 7 days course.  - cultures = NGTD, urine legionella -ve, urine strep -ve  - trend fever and WBC curve: afebrile, leukocytosis resolved  - repeat CXR: right basilar opacity    DVT: n/a  GI: pantoprazole  Diet: Regular  Activity: Increase as tolerated  Dispo: Anticipate for tomorrow

## 2023-10-30 NOTE — PROGRESS NOTE ADULT - SUBJECTIVE AND OBJECTIVE BOX
Pt interviewed, examined and EMR chart reviewed.  Pt follow up for polysubstance abuse.  Pt denies taking any drugs while in hospital. Chart reviewed and patient does not remember anything.  Pt resting comfortably in bedside. Pt has no complaints this AM.  Hx of withdrawal     Has been in detox before __X___yes,   _____No    SOCIAL HISTORY:    REVIEW OF SYSTEMS:    Constitutional: No fever, weight loss or fatigue  ENT:  No difficulty hearing, tinnitus, vertigo; No sinus or throat pain  Neck: No pain or stiffness  Respiratory: No cough, wheezing, chills or hemoptysis  Cardiovascular: No chest pain, palpitations, shortness of breath, dizziness or leg swelling  Gastrointestinal: No abdominal or epigastric pain. No nausea, vomiting or hematemesis; No diarrhea or constipation. No melena or hematochezia.  Neurological: No headaches, memory loss, loss of strength, numbness or tremors  Musculoskeletal: No joint pain or swelling; No muscle, back or extremity pain  Psychiatric: No depression, anxiety, mood swings or difficulty sleeping    MEDICATIONS  (STANDING):  ampicillin/sulbactam  IVPB 3 Gram(s) IV Intermittent every 6 hours  pantoprazole    Tablet 40 milliGRAM(s) Oral before breakfast    MEDICATIONS  (PRN):  acetaminophen     Tablet .. 650 milliGRAM(s) Oral every 6 hours PRN Temp greater or equal to 38C (100.4F), Mild Pain (1 - 3)  aluminum hydroxide/magnesium hydroxide/simethicone Suspension 30 milliLiter(s) Oral every 4 hours PRN Dyspepsia  hydrOXYzine hydrochloride 25 milliGRAM(s) Oral once PRN Anxiety  melatonin 5 milliGRAM(s) Oral at bedtime PRN Insomnia  ondansetron Injectable 4 milliGRAM(s) IV Push every 8 hours PRN Nausea and/or Vomiting      Vital Signs Last 24 Hrs  T(C): 36 (30 Oct 2023 05:00), Max: 36.2 (29 Oct 2023 13:39)  T(F): 96.8 (30 Oct 2023 05:00), Max: 97.1 (29 Oct 2023 13:39)  HR: 66 (30 Oct 2023 05:00) (66 - 82)  BP: 145/84 (30 Oct 2023 05:00) (144/67 - 150/84)  BP(mean): --  RR: 18 (30 Oct 2023 05:00) (18 - 18)  SpO2: 100% (30 Oct 2023 09:04) (100% - 100%)    Parameters below as of 30 Oct 2023 09:04  Patient On (Oxygen Delivery Method): room air        PHYSICAL EXAM:    Constitutional: NAD, well-groomed, well-developed  HEENT: PERRLA, EOMI, Normal Hearing,   Neck: No LAD, No JVD  Back: Normal spine flexure, No CVA tenderness  Respiratory: CTAB/L  Cardiovascular: S1 and S2, RRR, no M/G/R  Gastrointestinal: BS+, soft, NT/ND  Extremities: No peripheral edema  Neurological: A/O x 3, no focal deficits    LABS:                        12.0   8.13  )-----------( 356      ( 30 Oct 2023 08:25 )             37.6     10-29    138  |  100  |  11  ----------------------------<  90  4.1   |  29  |  1.0    Ca    8.5      29 Oct 2023 07:21  Mg     1.9     10-29    TPro  5.7<L>  /  Alb  3.3<L>  /  TBili  0.3  /  DBili  x   /  AST  27  /  ALT  45<H>  /  AlkPhos  64  10-29      Urinalysis Basic - ( 29 Oct 2023 07:21 )    Color: x / Appearance: x / SG: x / pH: x  Gluc: 90 mg/dL / Ketone: x  / Bili: x / Urobili: x   Blood: x / Protein: x / Nitrite: x   Leuk Esterase: x / RBC: x / WBC x   Sq Epi: x / Non Sq Epi: x / Bacteria: x      Drug Screen Urine:  positive for fentanyl opiates, benzos, amphetamines.  Alcohol Level        RADIOLOGY & ADDITIONAL STUDIES:

## 2023-10-30 NOTE — PROGRESS NOTE ADULT - PROBLEM SELECTOR PLAN 1
After evaluation at this time continue off protocol. Use of PRNS for any withdrawal although unlikely. Pts concerns addressed  Pt will be monitored and supportive care provided.  No other changes to medical care plan for withdrawals.  Monitor labs/electrolytes as needed.    -Counseling provided   CATCH team involved for aftercare and pt will follow up with aftercare.

## 2023-10-31 LAB
ALBUMIN SERPL ELPH-MCNC: 3.5 G/DL — SIGNIFICANT CHANGE UP (ref 3.5–5.2)
ALBUMIN SERPL ELPH-MCNC: 3.5 G/DL — SIGNIFICANT CHANGE UP (ref 3.5–5.2)
ALP SERPL-CCNC: 63 U/L — SIGNIFICANT CHANGE UP (ref 30–115)
ALP SERPL-CCNC: 63 U/L — SIGNIFICANT CHANGE UP (ref 30–115)
ALT FLD-CCNC: 46 U/L — HIGH (ref 0–41)
ALT FLD-CCNC: 46 U/L — HIGH (ref 0–41)
ANION GAP SERPL CALC-SCNC: 11 MMOL/L — SIGNIFICANT CHANGE UP (ref 7–14)
ANION GAP SERPL CALC-SCNC: 11 MMOL/L — SIGNIFICANT CHANGE UP (ref 7–14)
AST SERPL-CCNC: 30 U/L — SIGNIFICANT CHANGE UP (ref 0–41)
AST SERPL-CCNC: 30 U/L — SIGNIFICANT CHANGE UP (ref 0–41)
BASOPHILS # BLD AUTO: 0.06 K/UL — SIGNIFICANT CHANGE UP (ref 0–0.2)
BASOPHILS # BLD AUTO: 0.06 K/UL — SIGNIFICANT CHANGE UP (ref 0–0.2)
BASOPHILS NFR BLD AUTO: 0.7 % — SIGNIFICANT CHANGE UP (ref 0–1)
BASOPHILS NFR BLD AUTO: 0.7 % — SIGNIFICANT CHANGE UP (ref 0–1)
BILIRUB SERPL-MCNC: 0.3 MG/DL — SIGNIFICANT CHANGE UP (ref 0.2–1.2)
BILIRUB SERPL-MCNC: 0.3 MG/DL — SIGNIFICANT CHANGE UP (ref 0.2–1.2)
BUN SERPL-MCNC: 11 MG/DL — SIGNIFICANT CHANGE UP (ref 10–20)
BUN SERPL-MCNC: 11 MG/DL — SIGNIFICANT CHANGE UP (ref 10–20)
CALCIUM SERPL-MCNC: 8.9 MG/DL — SIGNIFICANT CHANGE UP (ref 8.4–10.5)
CALCIUM SERPL-MCNC: 8.9 MG/DL — SIGNIFICANT CHANGE UP (ref 8.4–10.5)
CHLORIDE SERPL-SCNC: 102 MMOL/L — SIGNIFICANT CHANGE UP (ref 98–110)
CHLORIDE SERPL-SCNC: 102 MMOL/L — SIGNIFICANT CHANGE UP (ref 98–110)
CO2 SERPL-SCNC: 24 MMOL/L — SIGNIFICANT CHANGE UP (ref 17–32)
CO2 SERPL-SCNC: 24 MMOL/L — SIGNIFICANT CHANGE UP (ref 17–32)
CREAT SERPL-MCNC: 1.1 MG/DL — SIGNIFICANT CHANGE UP (ref 0.7–1.5)
CREAT SERPL-MCNC: 1.1 MG/DL — SIGNIFICANT CHANGE UP (ref 0.7–1.5)
EGFR: 86 ML/MIN/1.73M2 — SIGNIFICANT CHANGE UP
EGFR: 86 ML/MIN/1.73M2 — SIGNIFICANT CHANGE UP
EOSINOPHIL # BLD AUTO: 0.33 K/UL — SIGNIFICANT CHANGE UP (ref 0–0.7)
EOSINOPHIL # BLD AUTO: 0.33 K/UL — SIGNIFICANT CHANGE UP (ref 0–0.7)
EOSINOPHIL NFR BLD AUTO: 4 % — SIGNIFICANT CHANGE UP (ref 0–8)
EOSINOPHIL NFR BLD AUTO: 4 % — SIGNIFICANT CHANGE UP (ref 0–8)
GLUCOSE SERPL-MCNC: 104 MG/DL — HIGH (ref 70–99)
GLUCOSE SERPL-MCNC: 104 MG/DL — HIGH (ref 70–99)
HCT VFR BLD CALC: 38.4 % — LOW (ref 42–52)
HCT VFR BLD CALC: 38.4 % — LOW (ref 42–52)
HGB BLD-MCNC: 12.6 G/DL — LOW (ref 14–18)
HGB BLD-MCNC: 12.6 G/DL — LOW (ref 14–18)
IMM GRANULOCYTES NFR BLD AUTO: 1.2 % — HIGH (ref 0.1–0.3)
IMM GRANULOCYTES NFR BLD AUTO: 1.2 % — HIGH (ref 0.1–0.3)
LYMPHOCYTES # BLD AUTO: 1.98 K/UL — SIGNIFICANT CHANGE UP (ref 1.2–3.4)
LYMPHOCYTES # BLD AUTO: 1.98 K/UL — SIGNIFICANT CHANGE UP (ref 1.2–3.4)
LYMPHOCYTES # BLD AUTO: 24 % — SIGNIFICANT CHANGE UP (ref 20.5–51.1)
LYMPHOCYTES # BLD AUTO: 24 % — SIGNIFICANT CHANGE UP (ref 20.5–51.1)
MAGNESIUM SERPL-MCNC: 1.9 MG/DL — SIGNIFICANT CHANGE UP (ref 1.8–2.4)
MAGNESIUM SERPL-MCNC: 1.9 MG/DL — SIGNIFICANT CHANGE UP (ref 1.8–2.4)
MCHC RBC-ENTMCNC: 26.1 PG — LOW (ref 27–31)
MCHC RBC-ENTMCNC: 26.1 PG — LOW (ref 27–31)
MCHC RBC-ENTMCNC: 32.8 G/DL — SIGNIFICANT CHANGE UP (ref 32–37)
MCHC RBC-ENTMCNC: 32.8 G/DL — SIGNIFICANT CHANGE UP (ref 32–37)
MCV RBC AUTO: 79.7 FL — LOW (ref 80–94)
MCV RBC AUTO: 79.7 FL — LOW (ref 80–94)
MONOCYTES # BLD AUTO: 0.64 K/UL — HIGH (ref 0.1–0.6)
MONOCYTES # BLD AUTO: 0.64 K/UL — HIGH (ref 0.1–0.6)
MONOCYTES NFR BLD AUTO: 7.7 % — SIGNIFICANT CHANGE UP (ref 1.7–9.3)
MONOCYTES NFR BLD AUTO: 7.7 % — SIGNIFICANT CHANGE UP (ref 1.7–9.3)
NEUTROPHILS # BLD AUTO: 5.15 K/UL — SIGNIFICANT CHANGE UP (ref 1.4–6.5)
NEUTROPHILS # BLD AUTO: 5.15 K/UL — SIGNIFICANT CHANGE UP (ref 1.4–6.5)
NEUTROPHILS NFR BLD AUTO: 62.4 % — SIGNIFICANT CHANGE UP (ref 42.2–75.2)
NEUTROPHILS NFR BLD AUTO: 62.4 % — SIGNIFICANT CHANGE UP (ref 42.2–75.2)
NRBC # BLD: 0 /100 WBCS — SIGNIFICANT CHANGE UP (ref 0–0)
NRBC # BLD: 0 /100 WBCS — SIGNIFICANT CHANGE UP (ref 0–0)
PLATELET # BLD AUTO: 371 K/UL — SIGNIFICANT CHANGE UP (ref 130–400)
PLATELET # BLD AUTO: 371 K/UL — SIGNIFICANT CHANGE UP (ref 130–400)
PMV BLD: 8.8 FL — SIGNIFICANT CHANGE UP (ref 7.4–10.4)
PMV BLD: 8.8 FL — SIGNIFICANT CHANGE UP (ref 7.4–10.4)
POTASSIUM SERPL-MCNC: 4.4 MMOL/L — SIGNIFICANT CHANGE UP (ref 3.5–5)
POTASSIUM SERPL-MCNC: 4.4 MMOL/L — SIGNIFICANT CHANGE UP (ref 3.5–5)
POTASSIUM SERPL-SCNC: 4.4 MMOL/L — SIGNIFICANT CHANGE UP (ref 3.5–5)
POTASSIUM SERPL-SCNC: 4.4 MMOL/L — SIGNIFICANT CHANGE UP (ref 3.5–5)
PROT SERPL-MCNC: 6 G/DL — SIGNIFICANT CHANGE UP (ref 6–8)
PROT SERPL-MCNC: 6 G/DL — SIGNIFICANT CHANGE UP (ref 6–8)
RBC # BLD: 4.82 M/UL — SIGNIFICANT CHANGE UP (ref 4.7–6.1)
RBC # BLD: 4.82 M/UL — SIGNIFICANT CHANGE UP (ref 4.7–6.1)
RBC # FLD: 15.1 % — HIGH (ref 11.5–14.5)
RBC # FLD: 15.1 % — HIGH (ref 11.5–14.5)
SARS-COV-2 RNA SPEC QL NAA+PROBE: SIGNIFICANT CHANGE UP
SARS-COV-2 RNA SPEC QL NAA+PROBE: SIGNIFICANT CHANGE UP
SODIUM SERPL-SCNC: 137 MMOL/L — SIGNIFICANT CHANGE UP (ref 135–146)
SODIUM SERPL-SCNC: 137 MMOL/L — SIGNIFICANT CHANGE UP (ref 135–146)
WBC # BLD: 8.26 K/UL — SIGNIFICANT CHANGE UP (ref 4.8–10.8)
WBC # BLD: 8.26 K/UL — SIGNIFICANT CHANGE UP (ref 4.8–10.8)
WBC # FLD AUTO: 8.26 K/UL — SIGNIFICANT CHANGE UP (ref 4.8–10.8)
WBC # FLD AUTO: 8.26 K/UL — SIGNIFICANT CHANGE UP (ref 4.8–10.8)

## 2023-10-31 PROCEDURE — 99231 SBSQ HOSP IP/OBS SF/LOW 25: CPT

## 2023-10-31 RX ADMIN — MAGNESIUM OXIDE 400 MG ORAL TABLET 400 MILLIGRAM(S): 241.3 TABLET ORAL at 09:27

## 2023-10-31 RX ADMIN — AMPICILLIN SODIUM AND SULBACTAM SODIUM 200 GRAM(S): 250; 125 INJECTION, POWDER, FOR SUSPENSION INTRAMUSCULAR; INTRAVENOUS at 05:03

## 2023-10-31 RX ADMIN — Medication 650 MILLIGRAM(S): at 06:15

## 2023-10-31 RX ADMIN — Medication 650 MILLIGRAM(S): at 12:32

## 2023-10-31 RX ADMIN — PANTOPRAZOLE SODIUM 40 MILLIGRAM(S): 20 TABLET, DELAYED RELEASE ORAL at 05:02

## 2023-10-31 RX ADMIN — AMPICILLIN SODIUM AND SULBACTAM SODIUM 200 GRAM(S): 250; 125 INJECTION, POWDER, FOR SUSPENSION INTRAMUSCULAR; INTRAVENOUS at 12:32

## 2023-10-31 RX ADMIN — MAGNESIUM OXIDE 400 MG ORAL TABLET 400 MILLIGRAM(S): 241.3 TABLET ORAL at 17:28

## 2023-10-31 RX ADMIN — Medication 25 MILLIGRAM(S): at 06:15

## 2023-10-31 RX ADMIN — MAGNESIUM OXIDE 400 MG ORAL TABLET 400 MILLIGRAM(S): 241.3 TABLET ORAL at 12:32

## 2023-10-31 RX ADMIN — Medication 25 MILLIGRAM(S): at 14:41

## 2023-10-31 RX ADMIN — AMPICILLIN SODIUM AND SULBACTAM SODIUM 200 GRAM(S): 250; 125 INJECTION, POWDER, FOR SUSPENSION INTRAMUSCULAR; INTRAVENOUS at 23:41

## 2023-10-31 RX ADMIN — AMPICILLIN SODIUM AND SULBACTAM SODIUM 200 GRAM(S): 250; 125 INJECTION, POWDER, FOR SUSPENSION INTRAMUSCULAR; INTRAVENOUS at 17:30

## 2023-10-31 RX ADMIN — Medication 5 MILLIGRAM(S): at 22:29

## 2023-10-31 NOTE — PROGRESS NOTE ADULT - ASSESSMENT
43-year-old man with Hx polysubstance abuse, brought in by EMS status post opioid overdose. s/p narcan.    # Opioid overdose / polysubstance abuse  - urine tox positive for fentanyl, THC, benzodiazepines and amphetamine   - d/c IVF. encourage po hydration  - monitor VS   - addiction medicine: working on in-patient rehab placement.  - zofran PRN    # Dyspnea  # Aspiration pna  - now on room air  - c/w Unasyn switch to augmentin on discharge to complete total 7 days course.  - cultures = NGTD, urine legionella -ve, urine strep -ve  - trend fever and WBC curve: afebrile, leukocytosis resolved  - repeat CXR: right basilar opacity    DVT: n/a  GI: pantoprazole  Diet: Regular  Activity: Increase as tolerated  Dispo: Anticipate for tomorrow     43-year-old man with Hx polysubstance abuse, brought in by EMS status post opioid overdose. s/p narcan.    # Opioid overdose / polysubstance abuse  - urine tox positive for fentanyl, THC, benzodiazepines and amphetamine   - encourage po hydration  - monitor VS   - addiction medicine: pending in-patient rehab placement. likely tomorrow (11/01/23)  - zofran PRN    # Dyspnea  # Aspiration pna  - now on room air  - on Unasyn, d/c on 11/01/23 (day 7)  - cultures = NGTD, urine legionella -ve, urine strep -ve  - afebrile, leukocytosis resolved  - repeat CXR: right basilar opacity    DVT: n/a  GI: pantoprazole  Diet: Regular  Activity: Increase as tolerated  Dispo: Anticipate for tomorrow

## 2023-10-31 NOTE — PROGRESS NOTE ADULT - SUBJECTIVE AND OBJECTIVE BOX
SUBJECTIVE:    Patient is a 43y old Male who presents with a chief complaint of   Currently admitted to medicine with the primary diagnosis of Dyspnea    Interval history: Yesterday: As per RN: patient had a bottle of xanax pills on his sheet this morning.  Patient denies taking any xanax this morning. he reports taking 4 xanax pills and some opioids pills (not sure about name) last night around 8 PM. Patient also vapes daily.     Overnight events noted. Patient more awake and alert today. now on room air. denies any new complaints. Patient spoke with addiction team today. He initially refused in-patient rehab and had out-patient addiction f/up scheduled but later he decided to go for in-patient rehab. addiction team on board, working on placement.    Admit Diagnosis:  Dyspnea        PAST MEDICAL & SURGICAL HISTORY  Polysubstance abuse    No significant past surgical history    No pertinent past medical history    Polysubstance abuse    No significant past surgical history        SOCIAL HISTORY:  Negative for smoking/alcohol/drug use.     ALLERGIES:  No Known Allergies      PHYSICAL EXAM:  GEN: Lethargic, No acute distress  HEAD: atraumatic, normocephalic  EYES: EOMI, PERRL  ENT: moist mucus membranes, stain of yellowish phlegm around mouth  LUNGS: Clear to auscultation bilaterally   HEART: S1/S2  ABD: Soft, NT/ND. BS +  EXT: no cyanosis/edema  NEURO: AAOX3      Vital Signs Last 24 Hrs  T(C): 36.1 (31 Oct 2023 13:29), Max: 36.1 (31 Oct 2023 13:29)  T(F): 97 (31 Oct 2023 13:29), Max: 97 (31 Oct 2023 13:29)  HR: 55 (31 Oct 2023 13:29) (54 - 56)  BP: 157/81 (31 Oct 2023 13:29) (139/76 - 157/90)  BP(mean): --  RR: 16 (31 Oct 2023 13:29) (16 - 18)  SpO2: 98% (31 Oct 2023 09:00) (98% - 100%)    Parameters below as of 31 Oct 2023 09:00  Patient On (Oxygen Delivery Method): room air      I&Os:    MEDICATIONS:  STANDING MEDICATIONS  ampicillin/sulbactam  IVPB 3 Gram(s) IV Intermittent every 6 hours, 10-26-23 @ 20:03  magnesium oxide 400 milliGRAM(s) Oral three times a day with meals, 10-30-23 @ 11:55  pantoprazole    Tablet 40 milliGRAM(s) Oral before breakfast, 10-27-23 @ 16:18    PRN MEDICATIONS  acetaminophen     Tablet .. 650 milliGRAM(s) Oral every 6 hours, 10-26-23 @ 23:15 PRN  aluminum hydroxide/magnesium hydroxide/simethicone Suspension 30 milliLiter(s) Oral every 4 hours, 10-26-23 @ 23:15 PRN  hydrOXYzine hydrochloride 25 milliGRAM(s) Oral every 8 hours, 10-30-23 @ 20:01 PRN  melatonin 5 milliGRAM(s) Oral at bedtime, 10-29-23 @ 22:46 PRN  ondansetron Injectable 4 milliGRAM(s) IV Push every 8 hours, 10-26-23 @ 23:15 PRN    Diet, Regular (10-26-23 @ 23:17) [Active]    LABS:                                            12.6                  Neurophils% (auto):   62.4   (10-31 @ 04:30):    8.26 )-----------(371          Lymphocytes% (auto):  24.0                                          38.4                   Eosinphils% (auto):   4.0      Manual%: Neutrophils x    ; Lymphocytes x    ; Eosinophils x    ; Bands%: x    ; Blasts x          WBC trend: 8.26 <--, 8.13 <--, 7.02 <--, 9.43 <--  Hgb: 12.6 [10-31-23 @ 04:30]<--, 12.0 [10-30-23 @ 08:25]<--, 12.2 [10-29-23 @ 07:21]<--, 11.4 [10-28-23 @ 07:07]<--, 11.8 [10-27-23 @ 06:39]<--, 13.0 [10-26-23 @ 20:05]<--                              137    |  102    |  11                  Calcium: 8.9   / iCa: x      (10-31 @ 04:30)    ----------------------------<  104       Magnesium: 1.9                              4.4     |  24     |  1.1              Phosphorous: x        TPro  6.0    /  Alb  3.5    /  TBili  0.3    /  DBili  x      /  AST  30     /  ALT  46     /  AlkPhos  63     31 Oct 2023 04:30    Creatinine trend: 1.1<--, 1.2<--, 1.0<--, 1.0<--, 1.1<--, 1.4<--  SODIUM TREND: Sodium 137 [10-31 @ 04:30]<--, Sodium 139 [10-30 @ 08:25]<--, Sodium 138 [10-29 @ 07:21]<--, Sodium 138 [10-28 @ 07:07]<--, Sodium 136 [10-27 @ 06:39]<--, Sodium 142 [10-26 @ 20:05]<--      POC Glucose:           Culture - Sputum (collected 28 Oct 2023 19:24)  Source: .Sputum Sputum  Gram Stain (29 Oct 2023 06:06):    Moderate polymorphonuclear leukocytes per low power field    Rare Squamous epithelial cells per low power field    Rare Gram positive cocci in pairs per oil power field  Final Report (30 Oct 2023 20:02):    Normal Respiratory Estee present        Culture - Sputum (collected 10-28-23 @ 19:24)  Source: .Sputum Sputum  Gram Stain (10-29-23 @ 06:06):    Moderate polymorphonuclear leukocytes per low power field    Rare Squamous epithelial cells per low power field    Rare Gram positive cocci in pairs per oil power field  Final Report (10-30-23 @ 20:02):    Normal Respiratory Estee present    Culture - Blood (collected 10-27-23 @ 18:43)  Source: .Blood Blood-Peripheral  Preliminary Report (10-31-23 @ 03:01):    No growth at 72 Hours          Urinalysis Basic - ( 31 Oct 2023 04:30 )    Color: x / Appearance: x / SG: x / pH: x  Gluc: 104 mg/dL / Ketone: x  / Bili: x / Urobili: x   Blood: x / Protein: x / Nitrite: x   Leuk Esterase: x / RBC: x / WBC x   Sq Epi: x / Non Sq Epi: x / Bacteria: x        RADIOLOGY:    < from: Xray Chest 1 View AP/PA (10.28.23 @ 19:11) >  Low lung volume.    Right basilar opacity, unchanged.    < end of copied text >    < from: Xray Chest 1 View AP/PA (10.26.23 @ 20:39) >    Questionable right basilar opacity versus confluence of shadows.    < end of copied text > SUBJECTIVE:    Patient is a 43y old Male who presents with a chief complaint of   Currently admitted to medicine with the primary diagnosis of Dyspnea    Interval history: Yesterday: As per RN: patient had a bottle of xanax pills on his sheet this morning.  Patient denies taking any xanax this morning. he reports taking 4 xanax pills and some opioids pills (not sure about name) last night around 8 PM. Patient also vapes daily.     Overnight events noted. Patient more awake and alert today. now on room air. denies any new complaints. Patient waiting for in-patient addiction rehab placement.    Admit Diagnosis:  Dyspnea        PAST MEDICAL & SURGICAL HISTORY  Polysubstance abuse    No significant past surgical history    No pertinent past medical history    Polysubstance abuse    No significant past surgical history        SOCIAL HISTORY:  Negative for smoking/alcohol/drug use.     ALLERGIES:  No Known Allergies      PHYSICAL EXAM:  GEN: Lethargic, No acute distress  HEAD: atraumatic, normocephalic  EYES: EOMI, PERRL  ENT: moist mucus membranes, stain of yellowish phlegm around mouth  LUNGS: Clear to auscultation bilaterally   HEART: S1/S2  ABD: Soft, NT/ND. BS +  EXT: no cyanosis/edema  NEURO: AAOX3      Vital Signs Last 24 Hrs  T(C): 36.1 (31 Oct 2023 13:29), Max: 36.1 (31 Oct 2023 13:29)  T(F): 97 (31 Oct 2023 13:29), Max: 97 (31 Oct 2023 13:29)  HR: 55 (31 Oct 2023 13:29) (54 - 56)  BP: 157/81 (31 Oct 2023 13:29) (139/76 - 157/90)  BP(mean): --  RR: 16 (31 Oct 2023 13:29) (16 - 18)  SpO2: 98% (31 Oct 2023 09:00) (98% - 100%)    Parameters below as of 31 Oct 2023 09:00  Patient On (Oxygen Delivery Method): room air      I&Os:    MEDICATIONS:  STANDING MEDICATIONS  ampicillin/sulbactam  IVPB 3 Gram(s) IV Intermittent every 6 hours, 10-26-23 @ 20:03  magnesium oxide 400 milliGRAM(s) Oral three times a day with meals, 10-30-23 @ 11:55  pantoprazole    Tablet 40 milliGRAM(s) Oral before breakfast, 10-27-23 @ 16:18    PRN MEDICATIONS  acetaminophen     Tablet .. 650 milliGRAM(s) Oral every 6 hours, 10-26-23 @ 23:15 PRN  aluminum hydroxide/magnesium hydroxide/simethicone Suspension 30 milliLiter(s) Oral every 4 hours, 10-26-23 @ 23:15 PRN  hydrOXYzine hydrochloride 25 milliGRAM(s) Oral every 8 hours, 10-30-23 @ 20:01 PRN  melatonin 5 milliGRAM(s) Oral at bedtime, 10-29-23 @ 22:46 PRN  ondansetron Injectable 4 milliGRAM(s) IV Push every 8 hours, 10-26-23 @ 23:15 PRN    Diet, Regular (10-26-23 @ 23:17) [Active]    LABS:                                            12.6                  Neurophils% (auto):   62.4   (10-31 @ 04:30):    8.26 )-----------(371          Lymphocytes% (auto):  24.0                                          38.4                   Eosinphils% (auto):   4.0      Manual%: Neutrophils x    ; Lymphocytes x    ; Eosinophils x    ; Bands%: x    ; Blasts x          WBC trend: 8.26 <--, 8.13 <--, 7.02 <--, 9.43 <--  Hgb: 12.6 [10-31-23 @ 04:30]<--, 12.0 [10-30-23 @ 08:25]<--, 12.2 [10-29-23 @ 07:21]<--, 11.4 [10-28-23 @ 07:07]<--, 11.8 [10-27-23 @ 06:39]<--, 13.0 [10-26-23 @ 20:05]<--                              137    |  102    |  11                  Calcium: 8.9   / iCa: x      (10-31 @ 04:30)    ----------------------------<  104       Magnesium: 1.9                              4.4     |  24     |  1.1              Phosphorous: x        TPro  6.0    /  Alb  3.5    /  TBili  0.3    /  DBili  x      /  AST  30     /  ALT  46     /  AlkPhos  63     31 Oct 2023 04:30    Creatinine trend: 1.1<--, 1.2<--, 1.0<--, 1.0<--, 1.1<--, 1.4<--  SODIUM TREND: Sodium 137 [10-31 @ 04:30]<--, Sodium 139 [10-30 @ 08:25]<--, Sodium 138 [10-29 @ 07:21]<--, Sodium 138 [10-28 @ 07:07]<--, Sodium 136 [10-27 @ 06:39]<--, Sodium 142 [10-26 @ 20:05]<--      POC Glucose:           Culture - Sputum (collected 28 Oct 2023 19:24)  Source: .Sputum Sputum  Gram Stain (29 Oct 2023 06:06):    Moderate polymorphonuclear leukocytes per low power field    Rare Squamous epithelial cells per low power field    Rare Gram positive cocci in pairs per oil power field  Final Report (30 Oct 2023 20:02):    Normal Respiratory Estee present        Culture - Sputum (collected 10-28-23 @ 19:24)  Source: .Sputum Sputum  Gram Stain (10-29-23 @ 06:06):    Moderate polymorphonuclear leukocytes per low power field    Rare Squamous epithelial cells per low power field    Rare Gram positive cocci in pairs per oil power field  Final Report (10-30-23 @ 20:02):    Normal Respiratory Estee present    Culture - Blood (collected 10-27-23 @ 18:43)  Source: .Blood Blood-Peripheral  Preliminary Report (10-31-23 @ 03:01):    No growth at 72 Hours        COVID-19 PCR: NotDetec  (10/31/2023)      Urinalysis Basic - ( 31 Oct 2023 04:30 )    Color: x / Appearance: x / SG: x / pH: x  Gluc: 104 mg/dL / Ketone: x  / Bili: x / Urobili: x   Blood: x / Protein: x / Nitrite: x   Leuk Esterase: x / RBC: x / WBC x   Sq Epi: x / Non Sq Epi: x / Bacteria: x        RADIOLOGY:      < from: Xray Chest 1 View AP/PA (10.28.23 @ 19:11) >  Low lung volume.    Right basilar opacity, unchanged.    < end of copied text >    < from: Xray Chest 1 View AP/PA (10.26.23 @ 20:39) >    Questionable right basilar opacity versus confluence of shadows.    < end of copied text >

## 2023-11-01 ENCOUNTER — TRANSCRIPTION ENCOUNTER (OUTPATIENT)
Age: 43
End: 2023-11-01

## 2023-11-01 VITALS
OXYGEN SATURATION: 98 % | TEMPERATURE: 98 F | SYSTOLIC BLOOD PRESSURE: 135 MMHG | HEART RATE: 65 BPM | DIASTOLIC BLOOD PRESSURE: 87 MMHG | RESPIRATION RATE: 18 BRPM

## 2023-11-01 DIAGNOSIS — J69.0 PNEUMONITIS DUE TO INHALATION OF FOOD AND VOMIT: ICD-10-CM

## 2023-11-01 LAB
ANION GAP SERPL CALC-SCNC: 11 MMOL/L — SIGNIFICANT CHANGE UP (ref 7–14)
ANION GAP SERPL CALC-SCNC: 11 MMOL/L — SIGNIFICANT CHANGE UP (ref 7–14)
BUN SERPL-MCNC: 15 MG/DL — SIGNIFICANT CHANGE UP (ref 10–20)
BUN SERPL-MCNC: 15 MG/DL — SIGNIFICANT CHANGE UP (ref 10–20)
CALCIUM SERPL-MCNC: 9.2 MG/DL — SIGNIFICANT CHANGE UP (ref 8.4–10.5)
CALCIUM SERPL-MCNC: 9.2 MG/DL — SIGNIFICANT CHANGE UP (ref 8.4–10.5)
CHLORIDE SERPL-SCNC: 103 MMOL/L — SIGNIFICANT CHANGE UP (ref 98–110)
CHLORIDE SERPL-SCNC: 103 MMOL/L — SIGNIFICANT CHANGE UP (ref 98–110)
CO2 SERPL-SCNC: 25 MMOL/L — SIGNIFICANT CHANGE UP (ref 17–32)
CO2 SERPL-SCNC: 25 MMOL/L — SIGNIFICANT CHANGE UP (ref 17–32)
CREAT SERPL-MCNC: 1.3 MG/DL — SIGNIFICANT CHANGE UP (ref 0.7–1.5)
CREAT SERPL-MCNC: 1.3 MG/DL — SIGNIFICANT CHANGE UP (ref 0.7–1.5)
EGFR: 71 ML/MIN/1.73M2 — SIGNIFICANT CHANGE UP
EGFR: 71 ML/MIN/1.73M2 — SIGNIFICANT CHANGE UP
GLUCOSE SERPL-MCNC: 92 MG/DL — SIGNIFICANT CHANGE UP (ref 70–99)
GLUCOSE SERPL-MCNC: 92 MG/DL — SIGNIFICANT CHANGE UP (ref 70–99)
HCT VFR BLD CALC: 43.1 % — SIGNIFICANT CHANGE UP (ref 42–52)
HCT VFR BLD CALC: 43.1 % — SIGNIFICANT CHANGE UP (ref 42–52)
HGB BLD-MCNC: 14.3 G/DL — SIGNIFICANT CHANGE UP (ref 14–18)
HGB BLD-MCNC: 14.3 G/DL — SIGNIFICANT CHANGE UP (ref 14–18)
MCHC RBC-ENTMCNC: 25.6 PG — LOW (ref 27–31)
MCHC RBC-ENTMCNC: 25.6 PG — LOW (ref 27–31)
MCHC RBC-ENTMCNC: 33.2 G/DL — SIGNIFICANT CHANGE UP (ref 32–37)
MCHC RBC-ENTMCNC: 33.2 G/DL — SIGNIFICANT CHANGE UP (ref 32–37)
MCV RBC AUTO: 77.1 FL — LOW (ref 80–94)
MCV RBC AUTO: 77.1 FL — LOW (ref 80–94)
NRBC # BLD: 0 /100 WBCS — SIGNIFICANT CHANGE UP (ref 0–0)
NRBC # BLD: 0 /100 WBCS — SIGNIFICANT CHANGE UP (ref 0–0)
PLATELET # BLD AUTO: 445 K/UL — HIGH (ref 130–400)
PLATELET # BLD AUTO: 445 K/UL — HIGH (ref 130–400)
PMV BLD: 9.1 FL — SIGNIFICANT CHANGE UP (ref 7.4–10.4)
PMV BLD: 9.1 FL — SIGNIFICANT CHANGE UP (ref 7.4–10.4)
POTASSIUM SERPL-MCNC: 4.8 MMOL/L — SIGNIFICANT CHANGE UP (ref 3.5–5)
POTASSIUM SERPL-MCNC: 4.8 MMOL/L — SIGNIFICANT CHANGE UP (ref 3.5–5)
POTASSIUM SERPL-SCNC: 4.8 MMOL/L — SIGNIFICANT CHANGE UP (ref 3.5–5)
POTASSIUM SERPL-SCNC: 4.8 MMOL/L — SIGNIFICANT CHANGE UP (ref 3.5–5)
RBC # BLD: 5.59 M/UL — SIGNIFICANT CHANGE UP (ref 4.7–6.1)
RBC # BLD: 5.59 M/UL — SIGNIFICANT CHANGE UP (ref 4.7–6.1)
RBC # FLD: 15.1 % — HIGH (ref 11.5–14.5)
RBC # FLD: 15.1 % — HIGH (ref 11.5–14.5)
SODIUM SERPL-SCNC: 139 MMOL/L — SIGNIFICANT CHANGE UP (ref 135–146)
SODIUM SERPL-SCNC: 139 MMOL/L — SIGNIFICANT CHANGE UP (ref 135–146)
WBC # BLD: 9.88 K/UL — SIGNIFICANT CHANGE UP (ref 4.8–10.8)
WBC # BLD: 9.88 K/UL — SIGNIFICANT CHANGE UP (ref 4.8–10.8)
WBC # FLD AUTO: 9.88 K/UL — SIGNIFICANT CHANGE UP (ref 4.8–10.8)
WBC # FLD AUTO: 9.88 K/UL — SIGNIFICANT CHANGE UP (ref 4.8–10.8)

## 2023-11-01 PROCEDURE — 99239 HOSP IP/OBS DSCHRG MGMT >30: CPT

## 2023-11-01 RX ADMIN — MAGNESIUM OXIDE 400 MG ORAL TABLET 400 MILLIGRAM(S): 241.3 TABLET ORAL at 09:16

## 2023-11-01 RX ADMIN — Medication 25 MILLIGRAM(S): at 00:53

## 2023-11-01 RX ADMIN — AMPICILLIN SODIUM AND SULBACTAM SODIUM 200 GRAM(S): 250; 125 INJECTION, POWDER, FOR SUSPENSION INTRAMUSCULAR; INTRAVENOUS at 05:36

## 2023-11-01 RX ADMIN — PANTOPRAZOLE SODIUM 40 MILLIGRAM(S): 20 TABLET, DELAYED RELEASE ORAL at 06:20

## 2023-11-01 NOTE — DISCHARGE NOTE NURSING/CASE MANAGEMENT/SOCIAL WORK - PATIENT PORTAL LINK FT
You can access the FollowMyHealth Patient Portal offered by [facility] by registering at the following website: [email or web address]. By joining [employer]’s FollowMyHealth portal, you will also be able to view your health information using other applications (apps) compatible with our system.

## 2023-11-01 NOTE — DISCHARGE NOTE NURSING/CASE MANAGEMENT/SOCIAL WORK - CAREGIVER ADDRESS
VANCO DAILY FOLLOW UP NOTE  [facility] Pharmacokinetic Monitoring Service - Vancomycin    Consulting Provider: Dr. Alvarez Barkley   Indication: HAP  Target Concentration: Goal AUC/DEANNA 400-600 mg*hr/L  Day of Therapy: 1  Additional Antimicrobials: Cefepime    Patient eligible for piperacillin-tazobactam to cefepime auto-substitution per P&T approved protocol? N/A    Pertinent Laboratory Values: Wt Readings from Last 1 Encounters:   02/23/23 180 lb (81.6 kg)     Temp Readings from Last 1 Encounters:   02/24/23 97.9 °F (36.6 °C) (Oral)     Recent Labs     02/22/23  0539 02/23/23  2158 02/23/23  2210 02/24/23  0157   BUN 17 22*  --   --    CREATININE 0.93 1.02  --   --    WBC 6.3 8.7  --   --    PROCAL  --   --  0.10  --    LACTA  --  1.20  --  1.01     Estimated Creatinine Clearance: 52 mL/min (based on SCr of 1.02 mg/dL). No results found for: Ruben Bene    MRSA Nasal Swab: not ordered.  Order placed by pharmacy    Assessment:  Date/Time Dose Concentration AUC         Note: Serum concentrations collected for AUC dosing may appear elevated if collected in close proximity to the dose administered, this is not necessarily an indication of toxicity    Plan:  Dosing recommendations based on Bayesian software  Start vancomycin 2000 mg x 1 followed by 1250 mg every 24 hours for now  Anticipated AUC of 476 and trough concentration of 14.3 at steady state  Renal labs as indicated   Vancomycin concentrations will be ordered as clinically appropriate   Pharmacy will continue to monitor patient and adjust therapy as indicated    Thank you for the consult,  FALGUNI Dorado ISAURA [facility] [address] [place] [phone]

## 2023-11-01 NOTE — PROGRESS NOTE ADULT - ASSESSMENT
43M w/ Polysubstance Abuse admitted for Opioid overdose and Aspiration Pneumonia now stable for discharge today to Rehab    #Aspiration Pneumonia  - empirically treated  - Patient has completed 6d of Unasyn, is afebrile- can stop antibiotics today  - RLL opacification 10/26, improved on f/u cxr with treatment  #Opioid Overdose  - Addiction med was consulted and assisted with treatment  - patient to go to rehab upon discharge    I spent 35 minutes in preparation for discharge- evaluating the patient at bedside, coordinating care with RN/CM staff, Discussion w/ Addiction Medicine team, review of chart, and preparation of discharge paperwork

## 2023-11-01 NOTE — PROGRESS NOTE ADULT - SUBJECTIVE AND OBJECTIVE BOX
CC: 43M admit for Opioid Overdose, Aspiration Pneumonia    SUBJECTIVE / OVERNIGHT EVENTS:  No acute events overnight. Patient seen and evaluated at bedside. No fever/chills.  No cough    ROS:  No cp    MEDICATIONS  (STANDING):  ampicillin/sulbactam  IVPB 3 Gram(s) IV Intermittent every 6 hours  pantoprazole    Tablet 40 milliGRAM(s) Oral before breakfast    MEDICATIONS  (PRN):  acetaminophen     Tablet .. 650 milliGRAM(s) Oral every 6 hours PRN Temp greater or equal to 38C (100.4F), Mild Pain (1 - 3)  aluminum hydroxide/magnesium hydroxide/simethicone Suspension 30 milliLiter(s) Oral every 4 hours PRN Dyspepsia  hydrOXYzine hydrochloride 25 milliGRAM(s) Oral every 8 hours PRN Anxiety  melatonin 5 milliGRAM(s) Oral at bedtime PRN Insomnia  ondansetron Injectable 4 milliGRAM(s) IV Push every 8 hours PRN Nausea and/or Vomiting    CAPILLARY BLOOD GLUCOSE    I&O's Summary    Physical Exam  Vital Signs Last 24 Hrs  T(C): 36.4 (01 Nov 2023 04:47), Max: 36.4 (01 Nov 2023 04:47)  T(F): 97.6 (01 Nov 2023 04:47), Max: 97.6 (01 Nov 2023 04:47)  HR: 65 (01 Nov 2023 04:47) (55 - 67)  BP: 135/87 (01 Nov 2023 04:47) (135/87 - 157/81)  RR: 18 (01 Nov 2023 04:47) (16 - 18)  SpO2: 98% (01 Nov 2023 04:47) (98% - 98%)    Parameters below as of 01 Nov 2023 04:47  Patient On (Oxygen Delivery Method): room air    GENERAL: NAD  HEAD:  Atraumatic, Normocephalic  NECK: Supple, trachea midline  Lung: normal work of breathing, no rhonchi  Cardiovascular: S1&S2+, rrr, no m/r/g appreciated  ABDOMEN: soft, nt  SKIN: warm and dry, no visible purulence in exposed areas    LABS:                        14.3   9.88  )-----------( 445      ( 01 Nov 2023 07:39 )             43.1     11-01    139  |  103  |  15  ----------------------------<  92  4.8   |  25  |  1.3    Ca    9.2      01 Nov 2023 07:39  Mg     1.9     10-31    TPro  6.0  /  Alb  3.5  /  TBili  0.3  /  DBili  x   /  AST  30  /  ALT  46<H>  /  AlkPhos  63  10-31          Urinalysis Basic - ( 01 Nov 2023 07:39 )    Color: x / Appearance: x / SG: x / pH: x  Gluc: 92 mg/dL / Ketone: x  / Bili: x / Urobili: x   Blood: x / Protein: x / Nitrite: x   Leuk Esterase: x / RBC: x / WBC x   Sq Epi: x / Non Sq Epi: x / Bacteria: x          RADIOLOGY & ADDITIONAL TESTS:  Results Reviewed:   Imaging Personally Reviewed:  Electrocardiogram Personally Reviewed:    COORDINATION OF CARE:  Care Discussed with Consultants/Other Providers [Y/N]:  Prior or Outpatient Records Reviewed [Y/N]:

## 2023-11-04 LAB
DRUG SCREEN, SERUM: ABNORMAL
DRUG SCREEN, SERUM: ABNORMAL

## 2023-11-06 DIAGNOSIS — Z87.891 PERSONAL HISTORY OF NICOTINE DEPENDENCE: ICD-10-CM

## 2023-11-06 DIAGNOSIS — J96.01 ACUTE RESPIRATORY FAILURE WITH HYPOXIA: ICD-10-CM

## 2023-11-06 DIAGNOSIS — F19.19 OTHER PSYCHOACTIVE SUBSTANCE ABUSE WITH UNSPECIFIED PSYCHOACTIVE SUBSTANCE-INDUCED DISORDER: ICD-10-CM

## 2023-11-06 DIAGNOSIS — T40.2X1A POISONING BY OTHER OPIOIDS, ACCIDENTAL (UNINTENTIONAL), INITIAL ENCOUNTER: ICD-10-CM
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
## 2023-11-06 DIAGNOSIS — J69.0 PNEUMONITIS DUE TO INHALATION OF FOOD AND VOMIT: ICD-10-CM
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
## 2023-11-07 ENCOUNTER — APPOINTMENT (OUTPATIENT)
Dept: PSYCHIATRY | Facility: CLINIC | Age: 43
End: 2023-11-07
